# Patient Record
Sex: MALE | Race: ASIAN | NOT HISPANIC OR LATINO | Employment: FULL TIME | URBAN - METROPOLITAN AREA
[De-identification: names, ages, dates, MRNs, and addresses within clinical notes are randomized per-mention and may not be internally consistent; named-entity substitution may affect disease eponyms.]

---

## 2017-08-22 ENCOUNTER — OFFICE VISIT - HEALTHEAST (OUTPATIENT)
Dept: FAMILY MEDICINE | Facility: CLINIC | Age: 37
End: 2017-08-22

## 2017-08-22 DIAGNOSIS — E03.8 SUBCLINICAL HYPOTHYROIDISM: ICD-10-CM

## 2017-08-22 DIAGNOSIS — Z00.00 ROUTINE GENERAL MEDICAL EXAMINATION AT A HEALTH CARE FACILITY: ICD-10-CM

## 2017-08-22 LAB
CHOLEST SERPL-MCNC: 190 MG/DL
FASTING STATUS PATIENT QL REPORTED: YES
HDLC SERPL-MCNC: 41 MG/DL
LDLC SERPL CALC-MCNC: 115 MG/DL
TRIGL SERPL-MCNC: 169 MG/DL

## 2017-08-22 ASSESSMENT — MIFFLIN-ST. JEOR: SCORE: 1807.12

## 2017-08-23 ENCOUNTER — COMMUNICATION - HEALTHEAST (OUTPATIENT)
Dept: FAMILY MEDICINE | Facility: CLINIC | Age: 37
End: 2017-08-23

## 2017-08-23 DIAGNOSIS — E03.8 SUBCLINICAL HYPOTHYROIDISM: ICD-10-CM

## 2017-12-04 ENCOUNTER — OFFICE VISIT - HEALTHEAST (OUTPATIENT)
Dept: FAMILY MEDICINE | Facility: CLINIC | Age: 37
End: 2017-12-04

## 2017-12-04 DIAGNOSIS — E03.8 SUBCLINICAL HYPOTHYROIDISM: ICD-10-CM

## 2017-12-05 ENCOUNTER — HOSPITAL ENCOUNTER (OUTPATIENT)
Dept: ULTRASOUND IMAGING | Facility: CLINIC | Age: 37
Discharge: HOME | End: 2017-12-05
Attending: FAMILY MEDICINE

## 2017-12-05 ENCOUNTER — AMBULATORY - HEALTHEAST (OUTPATIENT)
Dept: FAMILY MEDICINE | Facility: CLINIC | Age: 37
End: 2017-12-05

## 2017-12-05 DIAGNOSIS — E55.9 VITAMIN D DEFICIENCY: ICD-10-CM

## 2017-12-05 DIAGNOSIS — E03.8 SUBCLINICAL HYPOTHYROIDISM: ICD-10-CM

## 2017-12-08 ENCOUNTER — AMBULATORY - HEALTHEAST (OUTPATIENT)
Dept: FAMILY MEDICINE | Facility: CLINIC | Age: 37
End: 2017-12-08

## 2017-12-08 DIAGNOSIS — E03.8 SUBCLINICAL HYPOTHYROIDISM: ICD-10-CM

## 2018-03-06 ENCOUNTER — COMMUNICATION - HEALTHEAST (OUTPATIENT)
Dept: FAMILY MEDICINE | Facility: CLINIC | Age: 38
End: 2018-03-06

## 2018-03-06 DIAGNOSIS — E55.9 VITAMIN D DEFICIENCY: ICD-10-CM

## 2018-06-11 ENCOUNTER — COMMUNICATION - HEALTHEAST (OUTPATIENT)
Dept: FAMILY MEDICINE | Facility: CLINIC | Age: 38
End: 2018-06-11

## 2018-06-11 DIAGNOSIS — E03.8 SUBCLINICAL HYPOTHYROIDISM: ICD-10-CM

## 2018-08-03 ENCOUNTER — COMMUNICATION - HEALTHEAST (OUTPATIENT)
Dept: FAMILY MEDICINE | Facility: CLINIC | Age: 38
End: 2018-08-03

## 2018-08-03 DIAGNOSIS — E03.8 SUBCLINICAL HYPOTHYROIDISM: ICD-10-CM

## 2018-08-24 ENCOUNTER — OFFICE VISIT - HEALTHEAST (OUTPATIENT)
Dept: FAMILY MEDICINE | Facility: CLINIC | Age: 38
End: 2018-08-24

## 2018-08-24 DIAGNOSIS — E55.9 VITAMIN D DEFICIENCY: ICD-10-CM

## 2018-08-24 DIAGNOSIS — L30.9 CHRONIC ECZEMA: ICD-10-CM

## 2018-08-24 DIAGNOSIS — E03.9 HYPOTHYROIDISM, UNSPECIFIED TYPE: ICD-10-CM

## 2018-08-24 DIAGNOSIS — Z00.00 ROUTINE GENERAL MEDICAL EXAMINATION AT A HEALTH CARE FACILITY: ICD-10-CM

## 2018-08-24 LAB
ALBUMIN SERPL-MCNC: 4.4 G/DL (ref 3.5–5)
ALP SERPL-CCNC: 52 U/L (ref 45–120)
ALT SERPL W P-5'-P-CCNC: 17 U/L (ref 0–45)
ANION GAP SERPL CALCULATED.3IONS-SCNC: 7 MMOL/L (ref 5–18)
AST SERPL W P-5'-P-CCNC: 19 U/L (ref 0–40)
BILIRUB SERPL-MCNC: 0.5 MG/DL (ref 0–1)
BUN SERPL-MCNC: 13 MG/DL (ref 8–22)
CALCIUM SERPL-MCNC: 9.9 MG/DL (ref 8.5–10.5)
CHLORIDE BLD-SCNC: 106 MMOL/L (ref 98–107)
CHOLEST SERPL-MCNC: 197 MG/DL
CO2 SERPL-SCNC: 27 MMOL/L (ref 22–31)
CREAT SERPL-MCNC: 1.03 MG/DL (ref 0.7–1.3)
FASTING STATUS PATIENT QL REPORTED: YES
GFR SERPL CREATININE-BSD FRML MDRD: >60 ML/MIN/1.73M2
GLUCOSE BLD-MCNC: 101 MG/DL (ref 70–125)
HDLC SERPL-MCNC: 48 MG/DL
LDLC SERPL CALC-MCNC: 132 MG/DL
POTASSIUM BLD-SCNC: 4.8 MMOL/L (ref 3.5–5)
PROT SERPL-MCNC: 7.7 G/DL (ref 6–8)
SODIUM SERPL-SCNC: 140 MMOL/L (ref 136–145)
T4 FREE SERPL-MCNC: 1.1 NG/DL (ref 0.7–1.8)
TRIGL SERPL-MCNC: 85 MG/DL
TSH SERPL DL<=0.005 MIU/L-ACNC: 3.22 UIU/ML (ref 0.3–5)

## 2018-08-24 ASSESSMENT — MIFFLIN-ST. JEOR: SCORE: 1786.26

## 2018-08-27 LAB
25(OH)D3 SERPL-MCNC: 17.9 NG/ML (ref 30–80)
25(OH)D3 SERPL-MCNC: 17.9 NG/ML (ref 30–80)

## 2018-11-13 ENCOUNTER — COMMUNICATION - HEALTHEAST (OUTPATIENT)
Dept: FAMILY MEDICINE | Facility: CLINIC | Age: 38
End: 2018-11-13

## 2018-11-13 DIAGNOSIS — E03.8 SUBCLINICAL HYPOTHYROIDISM: ICD-10-CM

## 2019-01-10 ENCOUNTER — OFFICE VISIT - HEALTHEAST (OUTPATIENT)
Dept: FAMILY MEDICINE | Facility: CLINIC | Age: 39
End: 2019-01-10

## 2019-01-10 DIAGNOSIS — J06.9 VIRAL URI WITH COUGH: ICD-10-CM

## 2019-06-03 ENCOUNTER — COMMUNICATION - HEALTHEAST (OUTPATIENT)
Dept: FAMILY MEDICINE | Facility: CLINIC | Age: 39
End: 2019-06-03

## 2019-06-03 DIAGNOSIS — E03.8 SUBCLINICAL HYPOTHYROIDISM: ICD-10-CM

## 2019-09-11 ENCOUNTER — COMMUNICATION - HEALTHEAST (OUTPATIENT)
Dept: FAMILY MEDICINE | Facility: CLINIC | Age: 39
End: 2019-09-11

## 2019-09-11 DIAGNOSIS — E03.8 SUBCLINICAL HYPOTHYROIDISM: ICD-10-CM

## 2019-09-13 ENCOUNTER — OFFICE VISIT - HEALTHEAST (OUTPATIENT)
Dept: FAMILY MEDICINE | Facility: CLINIC | Age: 39
End: 2019-09-13

## 2019-09-13 DIAGNOSIS — E03.9 HYPOTHYROIDISM, UNSPECIFIED TYPE: ICD-10-CM

## 2019-09-13 DIAGNOSIS — Z31.69 INFERTILITY COUNSELING: ICD-10-CM

## 2019-09-13 DIAGNOSIS — Z23 TETANUS-DIPHTHERIA (TD) VACCINATION: ICD-10-CM

## 2019-09-13 DIAGNOSIS — Z00.00 ROUTINE GENERAL MEDICAL EXAMINATION AT A HEALTH CARE FACILITY: ICD-10-CM

## 2019-09-13 DIAGNOSIS — E55.9 VITAMIN D DEFICIENCY: ICD-10-CM

## 2019-09-13 DIAGNOSIS — Z23 INFLUENZA VACCINE NEEDED: ICD-10-CM

## 2019-09-13 DIAGNOSIS — L30.9 CHRONIC ECZEMA: ICD-10-CM

## 2019-09-13 LAB
ALBUMIN SERPL-MCNC: 4.2 G/DL (ref 3.5–5)
ALP SERPL-CCNC: 50 U/L (ref 45–120)
ALT SERPL W P-5'-P-CCNC: 12 U/L (ref 0–45)
ANION GAP SERPL CALCULATED.3IONS-SCNC: 8 MMOL/L (ref 5–18)
AST SERPL W P-5'-P-CCNC: 18 U/L (ref 0–40)
BILIRUB SERPL-MCNC: 0.4 MG/DL (ref 0–1)
BUN SERPL-MCNC: 17 MG/DL (ref 8–22)
CALCIUM SERPL-MCNC: 9.4 MG/DL (ref 8.5–10.5)
CHLORIDE BLD-SCNC: 103 MMOL/L (ref 98–107)
CHOLEST SERPL-MCNC: 201 MG/DL
CO2 SERPL-SCNC: 26 MMOL/L (ref 22–31)
CREAT SERPL-MCNC: 1.04 MG/DL (ref 0.7–1.3)
FASTING STATUS PATIENT QL REPORTED: YES
GFR SERPL CREATININE-BSD FRML MDRD: >60 ML/MIN/1.73M2
GLUCOSE BLD-MCNC: 99 MG/DL (ref 70–125)
HDLC SERPL-MCNC: 48 MG/DL
HIV 1+2 AB+HIV1 P24 AG SERPL QL IA: NEGATIVE
LDLC SERPL CALC-MCNC: 130 MG/DL
POTASSIUM BLD-SCNC: 4.5 MMOL/L (ref 3.5–5)
PROT SERPL-MCNC: 7.6 G/DL (ref 6–8)
SODIUM SERPL-SCNC: 137 MMOL/L (ref 136–145)
TRIGL SERPL-MCNC: 116 MG/DL
TSH SERPL DL<=0.005 MIU/L-ACNC: 3.59 UIU/ML (ref 0.3–5)

## 2019-09-13 ASSESSMENT — MIFFLIN-ST. JEOR: SCORE: 1814.83

## 2019-09-16 LAB — 25(OH)D3 SERPL-MCNC: 18.8 NG/ML (ref 30–80)

## 2019-09-18 ENCOUNTER — AMBULATORY - HEALTHEAST (OUTPATIENT)
Dept: FAMILY MEDICINE | Facility: CLINIC | Age: 39
End: 2019-09-18

## 2019-09-18 DIAGNOSIS — E55.9 VITAMIN D DEFICIENCY: ICD-10-CM

## 2019-11-21 ENCOUNTER — AMBULATORY - HEALTHEAST (OUTPATIENT)
Dept: LAB | Facility: CLINIC | Age: 39
End: 2019-11-21

## 2019-11-21 DIAGNOSIS — Z31.69 INFERTILITY COUNSELING: ICD-10-CM

## 2019-11-22 LAB
ANNOTATION COMMENT IMP: ABNORMAL
CHARACTER SMN: NORMAL
IMM GERM CELLS # SMN: 0 X10(6)
PATIENT LOCATION: ABNORMAL
PH SMN: 8.5 [PH]
SEX ABSTIN DURATION TIME PATIENT: 2 D
SMN ANALYSIS METHOD: ABNORMAL
SPEC CONTAINER SPEC: ABNORMAL
SPECIMEN VOL SMN: 2.5 ML
SPERM # SMN: 18.7 X10(6)
SPERM ABNORM HEAD SHAPE NFR SMN: 15.5 %
SPERM ABNORM HEAD SIZE NFR SMN: 0 %
SPERM ABNORM MIDPIECE NFR SMN: 24.5 %
SPERM ABNORM TAIL NFR SMN: 34 %
SPERM ACROSOME DEFECTS NFR SMN: 21 %
SPERM AGGLUTINATED SMN QL: 4
SPERM DOUBLE FORMS NFR SMN: 1.5 %
SPERM MOTILE # SMN: 6.9 X10(6)
SPERM MOTILE NFR SMN: 17.3 X10(6)
SPERM MOTILE NFR SMN: 37 %
SPERM MOTILE SMN QL MICRO: 3.5
SPERM MULTIPLE DEFECTS NFR SMN: 0 %
SPERM NORM NFR SMN: 3.5 %
VISC SMN QL: 4
WBC # SMN: 0 X10(6)

## 2019-12-18 ENCOUNTER — COMMUNICATION - HEALTHEAST (OUTPATIENT)
Dept: FAMILY MEDICINE | Facility: CLINIC | Age: 39
End: 2019-12-18

## 2019-12-18 DIAGNOSIS — E55.9 VITAMIN D DEFICIENCY: ICD-10-CM

## 2020-06-25 ENCOUNTER — COMMUNICATION - HEALTHEAST (OUTPATIENT)
Dept: FAMILY MEDICINE | Facility: CLINIC | Age: 40
End: 2020-06-25

## 2020-06-25 DIAGNOSIS — E03.8 SUBCLINICAL HYPOTHYROIDISM: ICD-10-CM

## 2020-10-29 ENCOUNTER — COMMUNICATION - HEALTHEAST (OUTPATIENT)
Dept: FAMILY MEDICINE | Facility: CLINIC | Age: 40
End: 2020-10-29

## 2021-04-14 ENCOUNTER — COMMUNICATION - HEALTHEAST (OUTPATIENT)
Dept: FAMILY MEDICINE | Facility: CLINIC | Age: 41
End: 2021-04-14

## 2021-04-14 DIAGNOSIS — E03.8 SUBCLINICAL HYPOTHYROIDISM: ICD-10-CM

## 2021-05-19 ENCOUNTER — COMMUNICATION - HEALTHEAST (OUTPATIENT)
Dept: ADMINISTRATIVE | Facility: CLINIC | Age: 41
End: 2021-05-19

## 2021-05-19 ENCOUNTER — OFFICE VISIT - HEALTHEAST (OUTPATIENT)
Dept: FAMILY MEDICINE | Facility: CLINIC | Age: 41
End: 2021-05-19

## 2021-05-19 DIAGNOSIS — E55.9 VITAMIN D DEFICIENCY: ICD-10-CM

## 2021-05-19 DIAGNOSIS — Z11.59 NEED FOR HEPATITIS C SCREENING TEST: ICD-10-CM

## 2021-05-19 DIAGNOSIS — Z00.00 ROUTINE GENERAL MEDICAL EXAMINATION AT A HEALTH CARE FACILITY: ICD-10-CM

## 2021-05-19 DIAGNOSIS — E03.8 SUBCLINICAL HYPOTHYROIDISM: ICD-10-CM

## 2021-05-19 LAB
ALBUMIN SERPL-MCNC: 4 G/DL (ref 3.5–5)
ALP SERPL-CCNC: 50 U/L (ref 45–120)
ALT SERPL W P-5'-P-CCNC: 21 U/L (ref 0–45)
ANION GAP SERPL CALCULATED.3IONS-SCNC: 9 MMOL/L (ref 5–18)
AST SERPL W P-5'-P-CCNC: 19 U/L (ref 0–40)
BILIRUB SERPL-MCNC: 0.4 MG/DL (ref 0–1)
BUN SERPL-MCNC: 10 MG/DL (ref 8–22)
CALCIUM SERPL-MCNC: 9 MG/DL (ref 8.5–10.5)
CHLORIDE BLD-SCNC: 106 MMOL/L (ref 98–107)
CHOLEST SERPL-MCNC: 181 MG/DL
CO2 SERPL-SCNC: 27 MMOL/L (ref 22–31)
CREAT SERPL-MCNC: 1 MG/DL (ref 0.7–1.3)
FASTING STATUS PATIENT QL REPORTED: YES
GFR SERPL CREATININE-BSD FRML MDRD: >60 ML/MIN/1.73M2
GLUCOSE BLD-MCNC: 100 MG/DL (ref 70–125)
HDLC SERPL-MCNC: 42 MG/DL
LDLC SERPL CALC-MCNC: 101 MG/DL
POTASSIUM BLD-SCNC: 4.7 MMOL/L (ref 3.5–5)
PROT SERPL-MCNC: 7.2 G/DL (ref 6–8)
SODIUM SERPL-SCNC: 142 MMOL/L (ref 136–145)
T4 FREE SERPL-MCNC: 1 NG/DL (ref 0.7–1.8)
TRIGL SERPL-MCNC: 192 MG/DL
TSH SERPL DL<=0.005 MIU/L-ACNC: 4.82 UIU/ML (ref 0.3–5)

## 2021-05-19 RX ORDER — LEVOTHYROXINE SODIUM 75 UG/1
TABLET ORAL
Qty: 90 TABLET | Refills: 1 | Status: SHIPPED | OUTPATIENT
Start: 2021-05-19 | End: 2021-09-22

## 2021-05-20 LAB
25(OH)D3 SERPL-MCNC: 18.6 NG/ML (ref 30–80)
25(OH)D3 SERPL-MCNC: 18.6 NG/ML (ref 30–80)
HCV AB SERPL QL IA: NEGATIVE

## 2021-05-27 VITALS
HEART RATE: 66 BPM | SYSTOLIC BLOOD PRESSURE: 116 MMHG | WEIGHT: 193.5 LBS | OXYGEN SATURATION: 98 % | DIASTOLIC BLOOD PRESSURE: 82 MMHG

## 2021-05-31 VITALS — HEIGHT: 72 IN | WEIGHT: 188.3 LBS | BODY MASS INDEX: 25.5 KG/M2

## 2021-05-31 VITALS — WEIGHT: 190 LBS | BODY MASS INDEX: 25.77 KG/M2

## 2021-06-01 VITALS — WEIGHT: 183.7 LBS | HEIGHT: 72 IN | BODY MASS INDEX: 24.88 KG/M2

## 2021-06-01 NOTE — TELEPHONE ENCOUNTER
RN cannot approve Refill Request    RN can NOT refill this medication Protocol failed and NO refill given. Last office visit: 12/4/2017 Diego Dior MD Last Physical: 8/24/2018 Last MTM visit: Visit date not found Last visit same specialty: 1/10/2019 Sunita Morrow MD.  Next visit within 3 mo: Visit date not found  Next physical within 3 mo: Visit date not found      Paz Aquino, [facility] Triage/Med Refill 9/11/2019    Requested Prescriptions   Pending Prescriptions Disp Refills     levothyroxine (SYNTHROID, LEVOTHROID) 75 MCG tablet 90 tablet 0     Sig: Take 1 tablet (75 mcg total) by mouth Daily at 6:00 am.       Thyroid Hormones Protocol Failed - 9/11/2019  1:35 PM        Failed - TSH on file in past 12 months for patient age 12 & older     TSH   Date Value Ref Range Status   08/24/2018 3.22 0.30 - 5.00 uIU/mL Final                   Passed - Provider visit in past 12 months or next 3 months     Last office visit with prescriber/PCP: 12/4/2017 Diego Dior MD OR same dept: 1/10/2019 Sunita Morrow MD OR same specialty: 1/10/2019 Sunita Morrow MD  Last physical: 8/24/2018 Last MTM visit: Visit date not found   Next visit within 3 mo: Visit date not found  Next physical within 3 mo: Visit date not found  Prescriber OR PCP: Diego Dior MD  Last diagnosis associated with med order: 1. Subclinical hypothyroidism  - levothyroxine (SYNTHROID, LEVOTHROID) 75 MCG tablet; Take 1 tablet (75 mcg total) by mouth Daily at 6:00 am.  Dispense: 90 tablet; Refill: 0    If protocol passes may refill for 12 months if within 3 months of last provider visit (or a total of 15 months).

## 2021-06-02 VITALS — WEIGHT: 185.4 LBS | BODY MASS INDEX: 25.14 KG/M2

## 2021-06-03 VITALS
WEIGHT: 190 LBS | RESPIRATION RATE: 16 BRPM | DIASTOLIC BLOOD PRESSURE: 78 MMHG | HEART RATE: 66 BPM | HEIGHT: 72 IN | SYSTOLIC BLOOD PRESSURE: 114 MMHG | OXYGEN SATURATION: 99 % | BODY MASS INDEX: 25.73 KG/M2

## 2021-06-04 NOTE — TELEPHONE ENCOUNTER
Reached out to patient and relayed the below message. No additional questions at this time. Barbie Lorenz

## 2021-06-04 NOTE — TELEPHONE ENCOUNTER
RN cannot approve Refill Request    RN can NOT refill this medication med is not covered by policy/route to provider. Last office visit: 12/4/2017 Diego Dior MD Last Physical: 9/13/2019 Last MTM visit: Visit date not found Last visit same specialty: 1/10/2019 Sunita Morrow MD.  Next visit within 3 mo: Visit date not found  Next physical within 3 mo: Visit date not found      Isaura Torrez, [facility] Triage/Med Refill 12/19/2019    Requested Prescriptions   Pending Prescriptions Disp Refills     ergocalciferol (ERGOCALCIFEROL) 1,250 mcg (50,000 unit) capsule [Pharmacy Med Name: VITAMIN D2 50,000IU (ERGO) CAP RX] 12 capsule 0     Sig: TAKE 1 CAPSULE BY MOUTH 1 TIME A WEEK FOR 12 DOSES       There is no refill protocol information for this order

## 2021-06-12 NOTE — PROGRESS NOTES
Assessment:     1. Routine general medical examination at a health care facility  - Lipid Cascade  - Comprehensive Metabolic Panel    2. Subclinical hypothyroidism  - Thyroid Stimulating Hormone (TSH)  - T4, Free       Plan:      1.  Counseling done with regards to the necessity for physical exam.  Discussed cardiovascular disease as well as cancer screening.  I did encourage him to make sure to exercise adequately.  Also encouraged him to continue to watch his diet.  We will recheck his thyroid to the since he has not been on the medication for some months now.  Will restart medication depending on the results.  He will call to let me know if he has any other needs.  He is currently still working on infertility and his wife is being seen by an OB/GYN.  He is a semen analysis report was printed and given to him.  2. Patient Counseling:  --Nutrition: Stressed importance of moderation in sodium/caffeine intake, saturated fat and cholesterol, caloric balance, sufficient intake of fresh fruits, vegetables, fiber, calcium, iron, and 1 mg of folate supplement per day (for females capable of pregnancy).  --Exercise: Stressed the importance of regular exercise.  This was discussed, with regards to cardiovascular disease prevention as well as prevention of diabetes mellitus, hypertension and hyperlipidemia.  --Consistent screen was also discussed.  Discussion based specifically on patient's age.  --Questions regards to patient's health maintenance were answered, and full counseling done without regards.     --Immunizations reviewed.  --Discussed benefits of screening colonoscopy.  --After hours service discussed with patient    3. Discussed the patient's BMI with him.  The BMI is in the acceptable range  4. Follow up as needed for acute illness   5.The following high BMI interventions were performed this visit: encouragement to exercise, weight monitoring and prescribed dietary intake  Subjective:       Manoj Ling is a  37 y.o. male and is here for a comprehensive physical exam. The patient reports no problems.  He continues to exercise being aware of his father's history of heart disease.  We will recheck his cholesterol and lipids and compared with the previous ones that he has had.  He does exercise do not really very adequately he uses the elliptical about once a week and does walk also once a week.  He has a history of subclinical hypothyroidism, he was on replacement levothyroxine at 50 mcg daily but because of his Jain fasting he stopped taking it about 2 months or so ago on his own.  He will want to have a recheck of the thyroid function and restart the medication if needed.  He denied having any known symptoms at this point.  He noted no specific changes but he was aware of while he was taken the medication.    Do you take any herbs or supplements that were not prescribed by a doctor? no  Are you taking aspirin daily? no    History reviewed. No pertinent past medical history.  History reviewed. No pertinent surgical history.  Social History     Social History     Marital status:      Spouse name: N/A     Number of children: N/A     Years of education: N/A     Social History Main Topics     Smoking status: Never Smoker     Smokeless tobacco: Never Used     Alcohol use No     Drug use: No     Sexual activity: Yes     Partners: Female     Other Topics Concern     None     Social History Narrative     Family History   Problem Relation Age of Onset     Hypertension Mother      Thyroid disease Mother      Diabetes Father      Heart disease Father      Cancer Maternal Grandfather      No Known Allergies  Current Outpatient Prescriptions   Medication Sig Dispense Refill     levothyroxine (SYNTHROID, LEVOTHROID) 50 MCG tablet Take 1 tablet (50 mcg total) by mouth Daily at 6:00 am. 90 tablet 1     No current facility-administered medications for this visit.          Review of Systems  Do you have pain that bothers you in  your daily life? no    Review of Systems   Constitutional:Denied any fatigue no fevers no chills.  Has good appetite.  HEENT: Does not have any neck pain.  No difficulty swallowing, denies having any postnasal drips.No voice changes.  Respiratory: There is no cough.  No chest wall pain.  Cardiovascular: Denied chest pain shortness of breath or palpitations.  There is no notable lower extremity swelling.    Gastrointestinal: Denies nausea vomiting.  No abdominal pain no diarrhea or constipation.  Endocrine:Has no sensitivity to cold or heat.  Denied undue thirst.   Genitourinary:Has no urinary symptoms, no nocturia.  Musculoskeletal: There is no musculoskeletal pain and swelling.    Skin: He does not have any rashes.   Allergic/Immunologic: Negative.   Neurological: No Numbness  Hematological: Negative.   Psychiatric/Behavioral: No anxiety or depression symptoms.        Objective:     Vitals:    08/22/17 0806   BP: 104/70   Pulse: 60   Weight: 188 lb 4.8 oz (85.4 kg)   Height: 6' (1.829 m)     Physical Exam:  General Appearance: Alert, cooperative, no distress, appears stated age  Head: Normocephalic, without obvious abnormality, atraumatic  Eyes: PERRL, conjunctiva/corneas clear, EOM's intact  Ears: Normal TM's and external ear canals, both ears  Nose: Nares normal, septum midline,mucosa normal, no drainage  Throat: Lips, mucosa, and tongue normal; teeth and gums normal  Neck: Supple, symmetrical, trachea midline, no adenopathy;  thyroid: not enlarged, symmetric, no tenderness  Back: Symmetric, no curvature, ROM normal, no CVA tenderness  Lungs: Clear to auscultation bilaterally, respirations unlabored  Heart: Regular rate and rhythm, S1 and S2 normal, no murmur, rub, or gallop,  Abdomen: Soft, non-tender, bowel sounds active all four quadrants,  no masses, no organomegaly.  Musculoskeletal: Normal range of motion. No joint swelling or deformity.   Extremities: Extremities normal, atraumatic, no cyanosis or  edema  Skin: Skin color, texture, turgor normal, no rashes or lesions  Lymph nodes: Cervical, supraclavicular, and axillary nodes normal  Neurologic:  Alert and oriented times 3. He has normal reflexes.   Psychiatric: He has a normal mood and affect.

## 2021-06-14 NOTE — PROGRESS NOTES
ASSESSMENT:  1. Subclinical hypothyroidism  - Vitamin D, Total (25-Hydroxy)  - Thyroid Stimulating Hormone (TSH)  - T4, Free  - US Thyroid; Future      PLAN:  Explained to him the Physiology of thyroid gland.  At this point it appears as if he is not responding to medication use.  I would recheck his a thyroid gland at this point and depending on the level will increase his medications and get an ultrasound.  If there is no improvement then I will consider stopping the medication entirely possibly referring him to see the endocrinologist .    Orders Placed This Encounter   Procedures     US Thyroid     Standing Status:   Future     Standing Expiration Date:   12/4/2018     Order Specific Question:   Reason for Exam (Describe Symptoms):     Answer:   Subclinical Hypothyroidism.     Order Specific Question:   Can the procedure be changed per Radiologist protocol?     Answer:   Yes     Vitamin D, Total (25-Hydroxy)     Thyroid Stimulating Hormone (TSH)     T4, Free     There are no discontinued medications.    No Follow-up on file.      CHIEF COMPLAINT:  Chief Complaint   Patient presents with     Thyroid Problem     f/u on thyroid medications       HISTORY OF PRESENT ILLNESS:  Manoj is a 37 y.o. male presenting to the clinic today for follow-up of subclinical hypothyroidism.  He has been on levothyroxine at 50 mcg daily.  He denied having any notable improvement in his feelings.  He has not really been feeling any symptoms of hypothyroidism.  He noted that he is having increased difficulty with epigastric pain from possible reflux.  He denied any palpitations, he has no weight gain, does not have any abdominal discomfort, there is no notable constipation and he has been sleeping well and denies any nausea.  Did note that his appetite is good.  Of note he tends to looking to getting pregnant, his wife does have an appointment with the OB/GYN due to that.    REVIEW OF SYSTEMS:  Review of systems was done as in the  history otherwise negative.    PFSH:  Reviewed, as below.    History   Smoking Status     Never Smoker   Smokeless Tobacco     Never Used       Family History   Problem Relation Age of Onset     Hypertension Mother      Thyroid disease Mother      Diabetes Father      Heart disease Father      Cancer Maternal Grandfather        Social History     Social History     Marital status:      Spouse name: N/A     Number of children: N/A     Years of education: N/A     Occupational History     Not on file.     Social History Main Topics     Smoking status: Never Smoker     Smokeless tobacco: Never Used     Alcohol use No     Drug use: No     Sexual activity: Yes     Partners: Female     Other Topics Concern     Not on file     Social History Narrative       No past surgical history on file.    No Known Allergies    Active Ambulatory Problems     Diagnosis Date Noted     Infertility counseling 12/31/2014     Abnormal laboratory test result 12/31/2014     Reflux esophagitis 03/31/2015     Routine general medical examination at a health care facility 03/31/2015     Resolved Ambulatory Problems     Diagnosis Date Noted     No Resolved Ambulatory Problems     No Additional Past Medical History       Current Outpatient Prescriptions   Medication Sig Dispense Refill     levothyroxine (SYNTHROID, LEVOTHROID) 50 MCG tablet Take 1 tablet (50 mcg total) by mouth Daily at 6:00 am. 90 tablet 1     fluorometholone (FML) 0.1 % ophthalmic suspension INT 1 GTS AEY QID FOR 2 WEEKS  0     gatifloxacin (ZYMAXID) 0.5 % Drop ophthalmic solution INT 1 GTT AEY QID FOR 2 WEEKS. OK TO STOP IF THE BOTTLE RUNS OUT  0     No current facility-administered medications for this visit.        VITALS:  Vitals:    12/04/17 0819   BP: 116/73   Patient Site: Left Arm   Patient Position: Sitting   Cuff Size: Adult Regular   Pulse: 66   Weight: 190 lb (86.2 kg)     Wt Readings from Last 3 Encounters:   12/04/17 190 lb (86.2 kg)   08/22/17 188 lb 4.8 oz  (85.4 kg)   12/07/16 185 lb (83.9 kg)     Body mass index is 25.77 kg/(m^2).    PHYSICAL EXAM:  General Appearance: Alert, cooperative, no distress, appears stated age  HEENT: Pupils are equal and reactive, extraocular motions is normal.  Oropharynx is moist.  Neck is supple no notable thyromegaly.  External ears are normal.  Lungs: Clear to auscultation bilaterally, respirations unlabored  Heart: Regular rate and rhythm, S1 and S2 normal, no murmur, rub, or gallop  Abdomen: Soft  Musculoskeletal: Normal range of motion. No joint swelling or deformity.   Neurologic:  Alert and oriented times 3.   Psychiatric: Normal mood and affect.    MEDICATIONS:  Current Outpatient Prescriptions   Medication Sig Dispense Refill     levothyroxine (SYNTHROID, LEVOTHROID) 50 MCG tablet Take 1 tablet (50 mcg total) by mouth Daily at 6:00 am. 90 tablet 1     fluorometholone (FML) 0.1 % ophthalmic suspension INT 1 GTS AEY QID FOR 2 WEEKS  0     gatifloxacin (ZYMAXID) 0.5 % Drop ophthalmic solution INT 1 GTT AEY QID FOR 2 WEEKS. OK TO STOP IF THE BOTTLE RUNS OUT  0     No current facility-administered medications for this visit.

## 2021-06-16 NOTE — TELEPHONE ENCOUNTER
Medication refill request for levothyroxine. Patient has not been seen or had labs done since 9/2019. I have sent patient a [facility] [employer] message to come in for visit. Refill pended for 30 days.   Edwina Rivera LPN

## 2021-06-20 NOTE — PROGRESS NOTES
MALE PREVENTATIVE EXAM    Assessment and Plan:       1. Routine general medical examination at a health care facility  - Lipid Cascade  - Comprehensive Metabolic Panel    2. Hypothyroidism, unspecified type  - Thyroid Stimulating Hormone (TSH)  - T4, Free    3. Chronic eczema  - fluocinonide (LIDEX) 0.05 % external solution; Apply topically 2 (two) times a day.  Dispense: 60 mL; Refill: 1    4. Vitamin D deficiency  - Vitamin D, Total (25-Hydroxy)    Next follow up:  No Follow-up on file.    Immunization Review  Adult Imm Review: No immunizations due today  He does not smoke.    I discussed the following with the patient:   Adult Healthy Living: Importance of regular exercise  Healthy nutrition  Getting adequate sleep  Stress management  We discussed his concern regarding the itching in his face and putting some steroid solution for him to use.  Also discussed infertility, and his wife has undergone proper testing.  He will let me know if there is any other investigations that they will want him to do and will order for that.  But he is otherwise doing well.  We will get labs them follow-up with him.  We will also recheck his thyroid levels today.      Subjective:   Chief Complaint: Manoj Ling is an 38 y.o. male here for a preventative health visit.     HPI: Comes in for his routine physical exams.  His last physical exam was last year.  He noted having continued to be physically active do a little bit less than last year.  Denied any major issues with that.  He does have history of hypothyroidism and is on replacement levothyroxine.  We will recheck his thyroid today to see if there is any changes.  He denied having any symptoms associated with that.  He has also been trying to get pregnant with his wife and has been unsuccessful at this point.  They have had some workup including hysterosalpingogram, he has had a semen analysis which was normal.  His wife does have a follow-up appointment with her OB/GYN and  they will inform us if there is any need for any further workup.  Otherwise he also has a some itching that is noted on the face underneath his beard.  Noted that each time he will shower or wash his face he has some burning sensation that appears to be worse on the right side.  Noted this been going on for some time now.    Healthy Habits  Are you taking a daily aspirin? No  Do you typically exercising at least 40 min, 3-4 times per week?  NO not the last 2 months but plans to start up again  Do you usually eat at least 4 servings of fruit and vegetables a day, include whole grains and fiber and avoid regularly eating high fat foods? Yes  Have you had an eye exam in the past two years? Yes  Do you see a dentist twice per year? Yes  Do you have any concerns regarding sleep? No    Safety Screen  If you own firearms, are they secured in a locked gun cabinet or with trigger locks? The patient does not own any firearms  No Data Recorded    Review of Systems:    Constitutional:Denied any fatigue no fevers no chills.  Has good appetite.  HEENT: Does not have any neck pain.  No difficulty swallowing denies having any postnasal drips.    Respiratory: There is no cough.  No chest wall pain.  Cardiovascular: Denied chest pain shortness of breath or palpitations.  There is no notable lower extremity swelling.    Gastrointestinal: Denies nausea vomiting.  No abdominal pain no diarrhea or constipation.  Endocrine:Has no sensitivity to cold or heat.  Denied undue thirst.   Genitourinary:Has no urinary symptoms, no nocturia.  Denies having any pain to the testicles.  Musculoskeletal: There is no musculoskeletal pain and swelling.    Skin: He does not have any rashes but noted itching to the face as noted.  Denied having any other skin concerns.  Allergic/Immunologic: Negative.   Neurological: No Numbness  Hematological: Negative.   Psychiatric/Behavioral: No anxiety or depression symptoms.  Please see above.  The rest of the  review of systems are negative for all systems.     Cancer Screening     Patient has no health maintenance due at this time          Patient Care Team:  Diego Diro MD as PCP - General        History     Not marked as reviewed during this visit.            Objective:   Vital Signs:   Vitals:    08/24/18 0810   BP: 118/88   Patient Site: Right Arm   Patient Position: Sitting   Cuff Size: Adult Regular   Pulse: 66   Weight: 183 lb 11.2 oz (83.3 kg)   Height: 6' (1.829 m)            PHYSICAL EXAM  Physical Exam:  General Appearance: Alert, cooperative, no distress, appears stated age  Head: Normocephalic, without obvious abnormality, atraumatic  Eyes: PERRL, conjunctiva/corneas clear, EOM's intact  Ears: Normal TM's and external ear canals, both ears  Nose: Nares normal, septum midline,mucosa normal, no drainage  Throat: Lips, mucosa, and tongue normal; teeth and gums normal  Neck: Supple, symmetrical, trachea midline, no adenopathy;  thyroid: not enlarged, symmetric, no tenderness/mass/nodules; no carotid bruit or JVD  Back: Symmetric, no curvature, ROM normal, no CVA tenderness  Lungs: Clear to auscultation bilaterally, respirations unlabored  Heart: Regular rate and rhythm, S1 and S2 normal, no murmur, rub, or gallop,  Abdomen: Soft, non-tender, bowel sounds active all four quadrants,  no masses, no organomegaly  Musculoskeletal: Normal range of motion. No joint swelling or deformity.   Extremities: Extremities normal, atraumatic, no cyanosis or edema  Skin: Skin color, texture, turgor normal.Has some mild darkish discoloration to the face,and side of the face.Mostly on the right side.  Lymph nodes: Cervical, supraclavicular, and axillary nodes normal  Neurologic: He is alert. He has normal reflexes.   Psychiatric: He has a normal mood and affect.              Medication List          These changes are accurate as of 8/24/18  8:43 AM.  If you have any questions, ask your nurse or doctor.                START taking these medications          fluocinonide 0.05 % external solution   Also known as:  LIDEX   INSTRUCTIONS:  Apply topically 2 (two) times a day.   Started by:  Diego Dior MD             CONTINUE taking these medications          levothyroxine 75 MCG tablet   Also known as:  SYNTHROID, LEVOTHROID   INSTRUCTIONS:  Take 1 tablet (75 mcg total) by mouth Daily at 6:00 am.             STOP taking these medications          fluorometholone 0.1 % ophthalmic suspension   Also known as:  FML   Stopped by:  Diego Dior MD       gatifloxacin 0.5 % Drop ophthalmic solution   Also known as:  ZYMAXID   Stopped by:  Diego Dior MD            Where to Get Your Medications      These medications were sent to [facility] [address] [place] [address] [address] [facility] [place] [address] [address], [facility] [id number]    Hours:  24-hours Phone:  [phone]      fluocinonide 0.05 % external solution             Additional Screenings Completed Today:

## 2021-06-23 NOTE — PATIENT INSTRUCTIONS - HE
For your symptoms:     Vitamin C, zinc and echinacea help to improve immunity and fight infection.     Warm pack to face 4 times a day for 15 min at a time will help loosen phlegm     Saline nasal washing will help to drain mucus and clear sinus infection. - ocean spray and simply saline     NetiPot or NeilMed Sinus Rinse (use distilled water or water that has been boiled and then cooled)    Tylenol 325 mg extra strength:    - Take 1 to 2 tablets by mouth every 4 hours as needed (maximum 4000mg orally per day)     Ibuprofen 200 mg tablets:    - Take 2-4 tablets up to 3 times a day as needed. (always with food, call for stomach upset)    Pseudophedrine (Sudafed)- 12 hr version decongestant  containing medications (the kind you have to get behind the counter at the pharmacy)    Afrin nose spray.  Every 12 hours, Do not use for more than three days    Delsym cough syrup (orange bottle)    Cepacol lozenges or chloraseptic spray may also help numb a sore throat.     Gargling with salt water or drinking hot water may also help.     Drink 2 liters of water today slowly and continue to hydrate aggressively     HUMIDIFIER MAY ALSO BE HELPFUL.

## 2021-06-23 NOTE — PROGRESS NOTES
Assessment/plan   Manoj Ling is a 38 y.o. male who is a patient of Diego Dior MD.    Manoj was seen today for uri.    Diagnoses and all orders for this visit:    Viral URI with cough. Discussed the typical course of a viral upper respiratory infection.  No antibiotics indicated at this time.  Recommend symptomatic treatment such as decongestants and acetaminophen or ibuprofen as appropriate.  Recommend follow up if getting worse or not improving.    -     benzonatate (TESSALON PERLES) 100 MG capsule; Take 1 capsule (100 mg total) by mouth every 6 (six) hours as needed for cough.      Follow up: for annual physical    Sunita Morrow MD    Subjective:      HPI: Manoj Ling is a 38 y.o. male who is here for:    Chief Complaint   Patient presents with     URI     cold/cough x 5 days       URI sx: Sx started with mild sore throat on morning of Friday 1/4. On sat he actually stayed in bed all day due to chills and start of a cough. Monday had cough, Tues thought it was better, Wed was worse. Low grade fever- 99.5 yesterday, 98.6 this AM  + cough, runny nose, congestion, intermittent aches.   Taking Tylenol cold and flu.    Traveled to [facility] and [facility]  12/23/18- 1/3/19 with his wife, cousin and aunt.  His wife was feeling a little sick with similar URI sx during the middle of the trip but she was getting better. At the end of the trip his cousin got sick as well and sx are similar.     Review of Systems:  No rash, swollen lymph nodes, night sweats or weight change.  12 point comprehensive review of systems was negative except as noted and HPI     Social History:  Social History     Social History Narrative     Not on file       Medical History:  Patient Active Problem List   Diagnosis     Infertility counseling     Abnormal laboratory test result     Reflux esophagitis     Routine general medical examination at a health care facility     No past medical history on file.  No past  surgical history on file.  Patient has no known allergies.  Family History   Problem Relation Age of Onset     Hypertension Mother      Thyroid disease Mother      Diabetes Father      Heart disease Father      Cancer Maternal Grandfather        Medications:  Current Outpatient Medications   Medication Sig Dispense Refill     fluocinonide (LIDEX) 0.05 % external solution Apply topically 2 (two) times a day. 60 mL 1     levothyroxine (SYNTHROID, LEVOTHROID) 75 MCG tablet Take 1 tablet (75 mcg total) by mouth Daily at 6:00 am. 90 tablet 1     benzonatate (TESSALON PERLES) 100 MG capsule Take 1 capsule (100 mg total) by mouth every 6 (six) hours as needed for cough. 30 capsule 0     No current facility-administered medications for this visit.        Imaging & Labs reviewed this visit: none      Objective:      Vitals:    01/10/19 1022   BP: 114/74   Pulse: 73   Temp: 98.7  F (37.1  C)   TempSrc: Oral   SpO2: 99%   Weight: 185 lb 6.4 oz (84.1 kg)       Physical Exam:     General: Alert, no acute distress.   HEENT: normocephalic, conjunctivae are clear, Normal pearly TMs bilaterally without erythema, pus or fluid. Position and landmarks are normal.  Nose with clear rhinorrhea  Oropharynx is mildly erythematous without petechea, without tonsillar hypertrophy, asymmetry, exudate or lesions.   Neck: supple without adenopathy  Lungs: Good aeration bilaterally. Clear to auscultation without wheezes, rales or rhonci.   Heart: regular rate and rhythm, normal S1 and S2, no murmurs  Abdomen: soft and nontender  Skin: clear without rash or lesions    Sunita Morrow MD

## 2021-06-27 ENCOUNTER — HEALTH MAINTENANCE LETTER (OUTPATIENT)
Age: 41
End: 2021-06-27

## 2021-06-28 NOTE — PROGRESS NOTES
Progress Notes by Diego Dior MD at 9/13/2019  8:00 AM     Author: Diego Dior MD Service: -- Author Type: Physician    Filed: 9/22/2019  4:10 PM Encounter Date: 9/13/2019 Status: Signed    : Diego Dior MD (Physician)       MALE PREVENTATIVE EXAM    Assessment and Plan:       1. Routine general medical examination at a health care facility  - Lipid Cascade  - Comprehensive Metabolic Panel  - HIV Antigen/Antibody Screening [place]  Routine labs ordered.Encouraged to continue to be aware of his activity.  2. Infertility counseling  - Semen Analysis  Still attempting to get pregnant.Will recheck the Semen analysis per the Obgyn.  3. Hypothyroidism, unspecified type  - Thyroid Cascade  No side effect to the medications.Will check the level.  4. Vitamin D deficiency  - Vitamin D, Total (25-Hydroxy)    5. Influenza vaccine needed    6. Tetanus-diphtheria (Td) vaccination  - Td, Preservative Free (green label)    7. Chronic eczema  - fluocinonide (LIDEX) 0.05 % external solution; Apply topically 2 (two) times a day.  Dispense: 60 mL; Refill: 2  Intermittent dermatitis on the beard. Solution has been helpful. Will refill.      Next follow up:  No follow-ups on file.    Immunization Review  Adult Imm Review: Due today, orders placed  Does not smoke.    I discussed the following with the patient:   Adult Healthy Living: Importance of regular exercise  Healthy nutrition  Getting adequate sleep  Stress management        Subjective:   Chief Complaint: Manoj Ling is an 39 y.o. male here for a preventative health visit.     HPI: Comes in today for his routine physical exam.  He is doing well.  Continues to be physically active, continues to watch his diet.  He does need to have his labs done.  He does have a history of hypothyroidism and has been on medication for that.  He noted no changes.  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  He has had a semen analysis and has been doing artificial insemination.  And has had some failed and was advised to consider doing another sperm analysis.  He is hoping to get an order for that at today's visit.  But he otherwise is feeling well.  He does need some medications refilled.  Healthy Habits  Are you taking a daily aspirin? No  Do you typically exercising at least 40 min, 3-4 times per week?  NO  Do you usually eat at least 4 servings of fruit and vegetables a day, include whole grains and fiber and avoid regularly eating high fat foods? Yes- tries to   Have you had an eye exam in the past two years? Yes  Do you see a dentist twice per year? NO  Do you have any concerns regarding sleep? No    Safety Screen  If you own firearms, are they secured in a locked gun cabinet or with trigger locks? The patient does not own any firearms  Do you feel you are safe where you are living?: Yes (9/13/2019  8:15 AM)  Do you feel you are safe in your relationship(s)?: Yes (9/13/2019  8:15 AM)      Review of Systems:    Constitutional:Denied any fatigue no fevers no chills.  Has good appetite.  HEENT: Does not have any neck pain.  No difficulty swallowing denies having any postnasal drips.    Respiratory: There is no cough.  No chest wall pain.  Cardiovascular: Denied chest pain shortness of breath or palpitations.  There is no notable lower extremity swelling.    Gastrointestinal: Denies nausea vomiting.  No abdominal pain no diarrhea or constipation.  Endocrine:Has no sensitivity to cold or heat.  Denied undue thirst.   Genitourinary:Has no urinary symptoms, no nocturia.  Musculoskeletal: There is no musculoskeletal pain and swelling.    Skin: He does not have any rashes.   Allergic/Immunologic: Negative.   Neurological: No Numbness  Hematological: Negative.   Psychiatric/Behavioral: No anxiety or depression symptoms.  Please see above.  The rest of the review of  systems are negative for all systems.     Cancer Screening     Patient has no health maintenance due at this time          Patient Care Team:  Diego Dior MD as PCP - General  Sunita Morrow MD as Assigned PCP        History     Reviewed By Date/Time Sections Reviewed    Becca Villafana CMA 9/13/2019  8:15 AM Tobacco            Objective:   Vital Signs:    Vitals:    09/13/19 0816   BP: 114/78   Pulse: 66   Resp: 16   SpO2: 99%   Weight: 190 lb (86.2 kg)   Height: 6' (1.829 m)     Physical Exam:  General Appearance: Alert, cooperative, no distress, appears stated age  Head: Normocephalic, without obvious abnormality, atraumatic  Eyes: PERRL, conjunctiva/corneas clear, EOM's intact  Ears: Normal TM's and external ear canals, both ears  Nose: Nares normal, septum midline,mucosa normal, no drainage  Throat: Lips, mucosa, and tongue normal; teeth and gums normal  Neck: Supple, symmetrical, trachea midline, no adenopathy;  thyroid: not enlarged, symmetric, no tenderness.  Back: Symmetric, no curvature, ROM normal, no CVA tenderness  Lungs: Clear to auscultation bilaterally, respirations unlabored  Heart: Regular rate and rhythm, S1 and S2 normal, no murmur, rub, or gallop,  Abdomen: Soft, non-tender, bowel sounds active all four quadrants,  no masses, no organomegaly  Musculoskeletal: Normal range of motion. No joint swelling or deformity.   Extremities: Extremities normal, atraumatic, no cyanosis or edema  Skin: Skin color, texture, turgor normal, no rashes or lesions  Lymph nodes: Cervical, supraclavicular, and axillary nodes normal  Neurologic: He is alert. He has normal reflexes.   Psychiatric: He has a normal mood and affect.              Medication List           Accurate as of 9/13/19  8:56 AM. If you have any questions, ask your nurse or doctor.               CONTINUE taking these medications    fluocinonide 0.05 % external solution  Also known as:  LIDEX  INSTRUCTIONS:  Apply topically  2 (two) times a day.        levothyroxine 75 MCG tablet  Also known as:  SYNTHROID, LEVOTHROID  INSTRUCTIONS:  Take 1 tablet (75 mcg total) by mouth Daily at 6:00 am.           STOP taking these medications    benzonatate 100 MG capsule  Also known as:  TESSALON PERLES  Stopped by:  Diego Dior MD           Where to Get Your Medications      These medications were sent to [id number] - [place] - [address] AT [facility] [address] [place] [address]    Hours:  24-hours Phone:  [phone]     fluocinonide 0.05 % external solution         Additional Screenings Completed Today:

## 2021-06-30 NOTE — PROGRESS NOTES
Progress Notes by Diego Dior MD at 5/19/2021  8:00 AM     Author: Diego Dior MD Service: -- Author Type: Physician    Filed: 5/19/2021  5:23 PM Encounter Date: 5/19/2021 Status: Signed    : Diego Dior MD (Physician)       MALE PREVENTATIVE EXAM    Assessment and Plan:     Patient has been advised of split billing requirements and indicates understanding: Yes    1. Routine general medical examination at a health care facility  - Lipid Cascade  - Comprehensive Metabolic Panel    2. Subclinical hypothyroidism  - Thyroid Stimulating Hormone (TSH)  - T4, Free    3. Vitamin D deficiency  - Vitamin D, Total (25-Hydroxy)    4. Need for hepatitis C screening test  - Hepatitis C Antibody (Anti-HCV)    I discussed his needs.  He is doing well.  Noted no major concerns at this time.  He is fasted so we will go ahead and get some labs.  Discussed health care screening but at this time will he is going to do the labs as noted above.  Encouraged him to continue to be as physically active as he can.  I did congratulate him on his recent discovery that his wife is pregnant and is about 15 months now.    Next follow up:  No follow-ups on file.    Immunization Review  Adult Imm Review: No immunizations due today  He does not smoke.    I discussed the following with the patient:   Adult Healthy Living: Importance of regular exercise  Healthy nutrition  Getting adequate sleep  Stress management    I have had an Advance Directives discussion with the patient.    Subjective:   Chief Complaint: Manoj Ling is an 41 y.o. male here for a preventative health visit.    Patient has been advised of split billing requirements and indicates understanding: Yes  HPI: Comes in today for physical exam.  Takes medications levothyroxine 75mcg for subclinical hypothyroidism.  Noted that he is doing well.  Does not have any major concerns at this time.  They have been doing gynecological  infertility clinics.  The wife is currently pregnant at 15 weeks.  He does feel well otherwise.    Healthy Habits  Are you taking a daily aspirin? No  Do you typically exercising at least 40 min, 3-4 times per week?  Yes  Do you usually eat at least 4 servings of fruit and vegetables a day, include whole grains and fiber and avoid regularly eating high fat foods? Yes  Have you had an eye exam in the past two years? Yes  Do you see a dentist twice per year? Yes  Do you have any concerns regarding sleep? No    Safety Screen  If you own firearms, are they secured in a locked gun cabinet or with trigger locks? The patient declines to answer  No data recorded    Review of Systems:  Please see above.  The rest of the review of systems are negative for all systems.     Cancer Screening     Patient has no health maintenance due at this time          Patient Care Team:  Diego Dior MD as PCP - General  Diego Dior MD as Assigned PCP        History     Reviewed By Date/Time Sections Reviewed    Ruben Galo, [facility] 5/19/2021  8:15 AM Tobacco            Objective:   Vital Signs:   Visit Vitals  /82 (Patient Site: Left Arm, Patient Position: Sitting, Cuff Size: Adult Regular)   Pulse 66   Wt 193 lb 8 oz (87.8 kg)   SpO2 98%   BMI 26.24 kg/m         Physical Exam:  General Appearance: Alert, cooperative, no distress, appears stated age  Head: Normocephalic, without obvious abnormality, atraumatic  Eyes: PERRL, conjunctiva/corneas clear, EOM's intact  Ears: Normal TM's and external ear canals, both ears  Nose: Nares normal, septum midline,mucosa normal, no drainage  Throat: Lips, mucosa, and tongue normal; teeth and gums normal  Neck: Supple, symmetrical, trachea midline, no adenopathy;  thyroid: not enlarged, symmetric, no tenderness/mass/nodules; no carotid bruit or JVD  Back: Symmetric, no curvature, ROM normal, no CVA tenderness  Lungs: Clear to auscultation bilaterally, respirations  unlabored  Heart: Regular rate and rhythm, S1 and S2 normal, no murmur, rub, or gallop,  Abdomen: Soft, non-tender, bowel sounds active all four quadrants,  no masses, no organomegaly  Musculoskeletal: Normal range of motion. No joint swelling or deformity.   Extremities: Extremities normal, atraumatic, no cyanosis or edema  Skin: Skin color, texture, turgor normal, no rashes or lesions  Lymph nodes: Cervical, supraclavicular, and axillary nodes normal  Neurologic: He is alert. He has normal reflexes.   Psychiatric: He has a normal mood and affect.              Medication List          Accurate as of May 19, 2021  3:36 PM. If you have any questions, ask your nurse or doctor.            CONTINUE taking these medications    cholecalciferol (vitamin D3) 5,000 unit Tab  INSTRUCTIONS: Take 2 capsules by mouth daily.        levothyroxine 75 MCG tablet  Also known as: SYNTHROID, LEVOTHROID  INSTRUCTIONS: TAKE ONE TABLET BY MOUTH DAILY AT 6:00 AM- PATIENT NEEDS TO BE SEEN FOR FURTHER REFILLS           STOP taking these medications    fluocinonide 0.05 % external solution  Also known as: LIDEX  Stopped by: Diego Dior MD            Additional Screenings Completed Today:

## 2021-07-03 NOTE — ADDENDUM NOTE
Addendum Note by Diego Dior MD at 12/4/2017  7:13 PM     Author: Diego Dior MD Service: -- Author Type: Physician    Filed: 12/4/2017  7:13 PM Encounter Date: 12/4/2017 Status: Signed    : Diego Dior MD (Physician)    Addended by: DIEGO DIOR on: 12/4/2017 07:13 PM        Modules accepted: Orders

## 2021-09-21 DIAGNOSIS — E03.8 SUBCLINICAL HYPOTHYROIDISM: ICD-10-CM

## 2021-09-21 NOTE — TELEPHONE ENCOUNTER
Reason for Call:  Medication or medication refill: levothyroxine (SYNTHROID, LEVOTHROID) 75 MCG tablet    Do you use a [facility]? NO  Name of the pharmacy and phone number for the current request: [facility] [address] in [place], [phone]    Name of the medication requested:   levothyroxine (SYNTHROID, LEVOTHROID) 75 MCG tablet 90 tablet 1 5/19/2021  No   Sig: [LEVOTHYROXINE (SYNTHROID, LEVOTHROID) 75 MCG TABLET] TAKE ONE TABLET BY MOUTH DAILY AT 6:00 AM     Can we leave a detailed message on this number? YES    Phone number patient can be reached at: Home number on file [phone] (home)    Best Time: any    Call taken on 9/21/2021 at 4:23 PM by Chata Robins

## 2021-09-24 RX ORDER — LEVOTHYROXINE SODIUM 75 UG/1
75 TABLET ORAL DAILY
Qty: 90 TABLET | Refills: 1 | Status: SHIPPED | OUTPATIENT
Start: 2021-09-24 | End: 2022-04-25

## 2021-10-16 ENCOUNTER — HEALTH MAINTENANCE LETTER (OUTPATIENT)
Age: 41
End: 2021-10-16

## 2021-10-18 ENCOUNTER — OFFICE VISIT (OUTPATIENT)
Dept: FAMILY MEDICINE | Facility: CLINIC | Age: 41
End: 2021-10-18
Payer: COMMERCIAL

## 2021-10-18 VITALS
HEART RATE: 84 BPM | SYSTOLIC BLOOD PRESSURE: 96 MMHG | WEIGHT: 193.3 LBS | DIASTOLIC BLOOD PRESSURE: 60 MMHG | BODY MASS INDEX: 26.22 KG/M2

## 2021-10-18 DIAGNOSIS — L30.9 ECZEMA OF FACE: Primary | ICD-10-CM

## 2021-10-18 PROCEDURE — 99213 OFFICE O/P EST LOW 20 MIN: CPT | Performed by: FAMILY MEDICINE

## 2021-10-18 RX ORDER — BETAMETHASONE VALERATE 1.2 MG/G
CREAM TOPICAL 2 TIMES DAILY
Qty: 45 G | Refills: 1 | Status: SHIPPED | OUTPATIENT
Start: 2021-10-18 | End: 2022-10-03

## 2021-10-18 NOTE — PROGRESS NOTES
Assessment & Plan     Eczema of face  - betamethasone valerate (VALISONE) 0.1 % external cream  Dispense: 45 g; Refill: 1  The lesions on the face does look like eczema/dermatitis mainly in the face.  I have given him the prescription as noted above to use for it.  And hopefully they will be helpful.  He can come in and let me know if there is no improvement he is having more changes.  }       No follow-ups on file.    Diego Dior MD  [facility]    Anna Aranda is a 41 year old who presents for the following health issues     HPI   Is here with concerns of him having some skin issues.  He has had scalp with seborrhea in the past.  He has also had some eczema around the face in the past.  Noted new lesions for about 2 to 3 months now.  Noted some itchiness.  He did not radiate anywhere wanted to come in to get a prescription.  Noted that he is doing well otherwise.  Expecting their first child in November.     Family History   Problem Relation Age of Onset     Hypertension Mother      Thyroid Disease Mother      Diabetes Father      Heart Disease Father      Cancer Maternal Grandfather       Social History     Socioeconomic History     Marital status:      Spouse name: Not on file     Number of children: Not on file     Years of education: Not on file     Highest education level: Not on file   Occupational History     Not on file   Tobacco Use     Smoking status: Never Smoker     Smokeless tobacco: Never Used   Substance and Sexual Activity     Alcohol use: No     Drug use: No     Sexual activity: Yes     Partners: Female   Other Topics Concern     Not on file   Social History Narrative     Not on file     Social Determinants of Health     Financial Resource Strain:      Difficulty of Paying Living Expenses:    Food Insecurity:      Worried About Running Out of Food in the Last Year:      Ran Out of Food in the Last Year:    Transportation Needs:      Lack of  Transportation (Medical):      Lack of Transportation (Non-Medical):    Physical Activity:      Days of Exercise per Week:      Minutes of Exercise per Session:    Stress:      Feeling of Stress :    Social Connections:      Frequency of Communication with Friends and Family:      Frequency of Social Gatherings with Friends and Family:      Attends Faith Services:      Active Member of Clubs or Organizations:      Attends Club or Organization Meetings:      Marital Status:    Intimate Partner Violence:      Fear of Current or Ex-Partner:      Emotionally Abused:      Physically Abused:      Sexually Abused:       Past Surgical History:   Procedure Laterality Date     LASIK  2017      No past medical history on file.         Review of Systems   Constitutional, HEENT, cardiovascular, pulmonary, gi and gu systems are negative, except as otherwise noted.      Objective    BP 96/60 (BP Location: Left arm, Patient Position: Sitting, Cuff Size: Adult Large)   Pulse 84   Wt 87.7 kg (193 lb 4.8 oz)   BMI 26.22 kg/m    Body mass index is 26.22 kg/m .  Physical Exam  Constitutional:       Appearance: Normal appearance. He is not ill-appearing.   Pulmonary:      Effort: Pulmonary effort is normal.   Skin:     General: Skin is warm.      Findings: Lesion present.      Comments: Hyperpigmented lesions noted on the left forehead, on the lower part of the neck as well as on the right cheek.  Lesion on the right cheek slightly raised but they all have faint scales.   Neurological:      Mental Status: He is alert.   Psychiatric:         Mood and Affect: Mood normal.

## 2022-07-23 ENCOUNTER — HEALTH MAINTENANCE LETTER (OUTPATIENT)
Age: 42
End: 2022-07-23

## 2022-07-28 ENCOUNTER — TELEPHONE (OUTPATIENT)
Dept: FAMILY MEDICINE | Facility: CLINIC | Age: 42
End: 2022-07-28

## 2022-07-28 NOTE — TELEPHONE ENCOUNTER
Reason for Call:  Other appointment    Detailed comments: Patient needs to be contacted to est. Care with a new provider for a med refill and physical. NO options were available for est. care for medication refill.     Phone Number Patient can be reached at: Home number on file [phone] (home)    Best Time: Any time    Can we leave a detailed message on this number? YES    Call taken on 7/28/2022 at 7:41 AM by Althea Gant

## 2022-08-08 ENCOUNTER — TELEPHONE (OUTPATIENT)
Dept: FAMILY MEDICINE | Facility: CLINIC | Age: 42
End: 2022-08-08

## 2022-08-08 DIAGNOSIS — E03.8 SUBCLINICAL HYPOTHYROIDISM: ICD-10-CM

## 2022-08-08 NOTE — TELEPHONE ENCOUNTER
Reason for Call:  Medication or medication refill:    Do you use a [facility]?  Name of the pharmacy and phone number for the current request:  [facility] - [place], [place] - [address] AT [address] TO [facility] [facility] [address]    Name of the medication requested: levothyroxine (SYNTHROID/LEVOTHROID) 75 MCG tablet    Other request: Pt called and is requesting fefill    Can we leave a detailed message on this number? YES    Phone number patient can be reached at: Home number on file [phone] (home)    Best Time: anytime    Call taken on 8/8/2022 at 3:55 PM by Denise Morrison

## 2022-08-09 RX ORDER — LEVOTHYROXINE SODIUM 75 UG/1
75 TABLET ORAL DAILY
Qty: 90 TABLET | Refills: 0 | Status: SHIPPED | OUTPATIENT
Start: 2022-08-09 | End: 2022-11-15

## 2022-08-09 NOTE — TELEPHONE ENCOUNTER
Left message for patient to call back to schedule an appointment with a new provider to establish care. Please help patient schedule an appt.

## 2022-08-09 NOTE — TELEPHONE ENCOUNTER
Left message for patient to call back., please relay below message and help schedule an appt, thanks.

## 2022-08-12 ENCOUNTER — TELEPHONE (OUTPATIENT)
Dept: FAMILY MEDICINE | Facility: CLINIC | Age: 42
End: 2022-08-12

## 2022-08-12 DIAGNOSIS — E03.8 SUBCLINICAL HYPOTHYROIDISM: ICD-10-CM

## 2022-08-12 RX ORDER — LEVOTHYROXINE SODIUM 75 UG/1
75 TABLET ORAL DAILY
Qty: 90 TABLET | Refills: 0 | Status: CANCELLED | OUTPATIENT
Start: 2022-08-12

## 2022-08-12 NOTE — TELEPHONE ENCOUNTER
Reason for Call:  Medication or medication refill:    Do you use a [facility]?  [facility]     Name of the medication requested: Levothyroxine 75mg     Other request: Patient has appt on 10/03/22 but will need refill to get him through until appt.  Patient is out of medication     Can we leave a detailed message on this number? YES    Phone number patient can be reached at: Home number on file [phone] (home)    Best Time: any     Call taken on 8/12/2022 at 10:46 AM by Ami Paz

## 2022-10-01 ENCOUNTER — HEALTH MAINTENANCE LETTER (OUTPATIENT)
Age: 42
End: 2022-10-01

## 2022-10-03 ENCOUNTER — OFFICE VISIT (OUTPATIENT)
Dept: FAMILY MEDICINE | Facility: CLINIC | Age: 42
End: 2022-10-03
Payer: COMMERCIAL

## 2022-10-03 VITALS
HEIGHT: 72 IN | SYSTOLIC BLOOD PRESSURE: 120 MMHG | OXYGEN SATURATION: 98 % | HEART RATE: 62 BPM | DIASTOLIC BLOOD PRESSURE: 80 MMHG | BODY MASS INDEX: 25.78 KG/M2 | WEIGHT: 190.3 LBS | TEMPERATURE: 97.7 F | RESPIRATION RATE: 16 BRPM

## 2022-10-03 DIAGNOSIS — Z00.00 ANNUAL PHYSICAL EXAM: ICD-10-CM

## 2022-10-03 DIAGNOSIS — D22.9 MULTIPLE ATYPICAL NEVI: ICD-10-CM

## 2022-10-03 DIAGNOSIS — E03.1 CONGENITAL HYPOTHYROIDISM WITHOUT GOITER: ICD-10-CM

## 2022-10-03 DIAGNOSIS — L20.9 ATOPIC DERMATITIS, UNSPECIFIED TYPE: ICD-10-CM

## 2022-10-03 DIAGNOSIS — Z23 HIGH PRIORITY FOR 2019-NCOV VACCINE: Primary | ICD-10-CM

## 2022-10-03 LAB
ALBUMIN SERPL BCG-MCNC: 4.4 G/DL (ref 3.5–5.2)
ALP SERPL-CCNC: 57 U/L (ref 40–129)
ALT SERPL W P-5'-P-CCNC: 14 U/L (ref 10–50)
ANION GAP SERPL CALCULATED.3IONS-SCNC: 9 MMOL/L (ref 7–15)
AST SERPL W P-5'-P-CCNC: 25 U/L (ref 10–50)
BILIRUB SERPL-MCNC: 0.4 MG/DL
BUN SERPL-MCNC: 13.9 MG/DL (ref 6–20)
CALCIUM SERPL-MCNC: 9.4 MG/DL (ref 8.6–10)
CHLORIDE SERPL-SCNC: 103 MMOL/L (ref 98–107)
CHOLEST SERPL-MCNC: 175 MG/DL
CREAT SERPL-MCNC: 0.97 MG/DL (ref 0.67–1.17)
DEPRECATED HCO3 PLAS-SCNC: 28 MMOL/L (ref 22–29)
ERYTHROCYTE [DISTWIDTH] IN BLOOD BY AUTOMATED COUNT: 12.4 % (ref 10–15)
GFR SERPL CREATININE-BSD FRML MDRD: >90 ML/MIN/1.73M2
GLUCOSE SERPL-MCNC: 90 MG/DL (ref 70–99)
HCT VFR BLD AUTO: 41.6 % (ref 40–53)
HDLC SERPL-MCNC: 41 MG/DL
HGB BLD-MCNC: 14.7 G/DL (ref 13.3–17.7)
LDLC SERPL CALC-MCNC: 105 MG/DL
MCH RBC QN AUTO: 28.9 PG (ref 26.5–33)
MCHC RBC AUTO-ENTMCNC: 35.3 G/DL (ref 31.5–36.5)
MCV RBC AUTO: 82 FL (ref 78–100)
NONHDLC SERPL-MCNC: 134 MG/DL
PLATELET # BLD AUTO: 276 10E3/UL (ref 150–450)
POTASSIUM SERPL-SCNC: 4.3 MMOL/L (ref 3.4–5.3)
PROT SERPL-MCNC: 7.8 G/DL (ref 6.4–8.3)
RBC # BLD AUTO: 5.09 10E6/UL (ref 4.4–5.9)
SODIUM SERPL-SCNC: 140 MMOL/L (ref 136–145)
TRIGL SERPL-MCNC: 143 MG/DL
TSH SERPL DL<=0.005 MIU/L-ACNC: 2.35 UIU/ML (ref 0.3–4.2)
WBC # BLD AUTO: 5.9 10E3/UL (ref 4–11)

## 2022-10-03 PROCEDURE — 99213 OFFICE O/P EST LOW 20 MIN: CPT | Mod: 25 | Performed by: STUDENT IN AN ORGANIZED HEALTH CARE EDUCATION/TRAINING PROGRAM

## 2022-10-03 PROCEDURE — 0124A COVID-19,PF,PFIZER BOOSTER BIVALENT: CPT | Performed by: STUDENT IN AN ORGANIZED HEALTH CARE EDUCATION/TRAINING PROGRAM

## 2022-10-03 PROCEDURE — 84443 ASSAY THYROID STIM HORMONE: CPT | Performed by: STUDENT IN AN ORGANIZED HEALTH CARE EDUCATION/TRAINING PROGRAM

## 2022-10-03 PROCEDURE — 36415 COLL VENOUS BLD VENIPUNCTURE: CPT | Performed by: STUDENT IN AN ORGANIZED HEALTH CARE EDUCATION/TRAINING PROGRAM

## 2022-10-03 PROCEDURE — 80053 COMPREHEN METABOLIC PANEL: CPT | Performed by: STUDENT IN AN ORGANIZED HEALTH CARE EDUCATION/TRAINING PROGRAM

## 2022-10-03 PROCEDURE — 90686 IIV4 VACC NO PRSV 0.5 ML IM: CPT | Performed by: STUDENT IN AN ORGANIZED HEALTH CARE EDUCATION/TRAINING PROGRAM

## 2022-10-03 PROCEDURE — 90471 IMMUNIZATION ADMIN: CPT | Performed by: STUDENT IN AN ORGANIZED HEALTH CARE EDUCATION/TRAINING PROGRAM

## 2022-10-03 PROCEDURE — 85027 COMPLETE CBC AUTOMATED: CPT | Performed by: STUDENT IN AN ORGANIZED HEALTH CARE EDUCATION/TRAINING PROGRAM

## 2022-10-03 PROCEDURE — 91312 COVID-19,PF,PFIZER BOOSTER BIVALENT: CPT | Performed by: STUDENT IN AN ORGANIZED HEALTH CARE EDUCATION/TRAINING PROGRAM

## 2022-10-03 PROCEDURE — 80061 LIPID PANEL: CPT | Performed by: STUDENT IN AN ORGANIZED HEALTH CARE EDUCATION/TRAINING PROGRAM

## 2022-10-03 PROCEDURE — 99396 PREV VISIT EST AGE 40-64: CPT | Mod: 25 | Performed by: STUDENT IN AN ORGANIZED HEALTH CARE EDUCATION/TRAINING PROGRAM

## 2022-10-03 RX ORDER — CLOBETASOL PROPIONATE 0.5 MG/G
OINTMENT TOPICAL 2 TIMES DAILY
Qty: 60 G | Refills: 0 | Status: SHIPPED | OUTPATIENT
Start: 2022-10-03

## 2022-10-03 ASSESSMENT — ENCOUNTER SYMPTOMS
FEVER: 0
SORE THROAT: 0
ABDOMINAL PAIN: 0
DYSURIA: 0
EYE PAIN: 0
DIZZINESS: 0
JOINT SWELLING: 0
HEADACHES: 0
HEMATURIA: 0
PALPITATIONS: 0
SHORTNESS OF BREATH: 1
NERVOUS/ANXIOUS: 0
FREQUENCY: 0
WEAKNESS: 0
ARTHRALGIAS: 0
NAUSEA: 0
CONSTIPATION: 0
CHILLS: 0
MYALGIAS: 0
HEMATOCHEZIA: 0
DIARRHEA: 0
COUGH: 0
HEARTBURN: 0
PARESTHESIAS: 0

## 2022-10-03 ASSESSMENT — PAIN SCALES - GENERAL: PAINLEVEL: NO PAIN (0)

## 2022-10-03 NOTE — PROGRESS NOTES
Assessment/ Plan     42-year-old male with past medical history of hypothyroidism who presents for annual physical exam.    1. Congenital hypothyroidism without goiter  Patient tells me he has a family history of hypothyroidism.  He was diagnosed by previous PCP based off lab testing.  Has been on 75 mcg daily since.  We will recheck his TSH today last check normal in 5/21  - TSH with free T4 reflex; Future    2. High priority for 2019-nCoV vaccine  - COVID-19,PF,PFIZER BOOSTER BIVALENT 12+Yrs    3. Atopic dermatitis, unspecified type  Patient has pruritus and dryness over his left cheek and forehead that is worse in the wintertime.  Really does not have a rash today but does have darkening of the skin over the area.  I question if these are changes related to chronic irritation from scratching.  Possible effect of chronic steroid use though he does not use the steroid cream more than a week or 2 at a time.  He is on third highest potency steroid but says the betamethasone does not work as well as previous medication was on.  Possibly clobetasol.  I did trial him on this.  We discussed extensively the possible side effects and he understands these.  Offered referral to dermatology for further work-up and management the patient wished to continue with clobetasol for now.  If patient has changing of the darkening I recommend he see dermatology for a biopsy given the location for best cosmetic effect.  - clobetasol (TEMOVATE) 0.05 % external ointment; Apply topically 2 times daily  Dispense: 60 g; Refill: 0    4. Annual physical exam  - CBC with platelets; Future  - Comprehensive metabolic panel (BMP + Alb, Alk Phos, ALT, AST, Total. Bili, TP); Future  - Lipid panel reflex to direct LDL Fasting; Future    5. Multiple atypical nevi  Patient has an atypical nevi over his right tricep that he states he was not born with and has developed over time.  Scattered melanocytic macules approximately 3 cm x 2 cm in area.  I  recommended a biopsy and we will schedule this.  Also has a nevus on his left inner thigh near his knee.  He states this has changed as well so we will biopsy this at the same time though I am less concerned about this particular lesion    Follow-up in:     Matthew Haile MD    Subjective:     Manoj Ling is a 42 year old male who presents for an annual exam.     Chief Complaint   Patient presents with     Physical     Pt is fasting. Flu shot. Covid booster. Med refill on levothyroxine.      He is feeling some SOB with mild activity over the last couple of months. No chest pain, wheezing, coughing. Some heart racing. Racing hear only with exertion. He used to use an elliptical regularly but not using it regularly.     Colonoscopy: no family Hx of colon    Answers for HPI/ROS submitted by the patient on 10/3/2022  Frequency of exercise:: None  Getting at least 3 servings of Calcium per day:: Yes  Diet:: Regular (no restrictions)  Taking medications regularly:: Yes  Medication side effects:: None  Bi-annual eye exam:: Yes  Dental care twice a year:: Yes  Sleep apnea or symptoms of sleep apnea:: None  abdominal pain: No  Blood in stool: No  Blood in urine: No  chest pain: No  chills: No  congestion: No  constipation: No  cough: No  diarrhea: No  dizziness: No  ear pain: No  eye pain: No  nervous/anxious: No  fever: No  frequency: No  genital sores: No  headaches: No  hearing loss: No  heartburn: No  arthralgias: No  joint swelling: No  peripheral edema: No  mood changes: No  myalgias: No  nausea: No  dysuria: No  palpitations: No  Skin sensation changes: No  sore throat: No  urgency: No  rash: No  shortness of breath: Yes  visual disturbance: No  weakness: No  impotence: No  penile discharge: No  Additional concerns today:: No    Immunization History   Administered Date(s) Administered     COVID-19,PF,Moderna 03/31/2021, 04/29/2021, 12/26/2021     HepA, Unspecified 12/13/2011     HepB, Unspecified 12/13/2011      Influenza Vaccine, 6+MO IM (QUADRIVALENT W/PRESERVATIVES) 09/23/2016, 09/13/2019     Meningococcal (Menactra ) 05/27/2013     Poliovirus, inactivated (IPV) 12/13/2011     TD (ADULT, 7+) 09/13/2019     Tdap (Adacel,Boostrix) 11/06/2009     Typhoid, Unspecified Formulation 12/13/2011     Immunization status: due today.     Current Outpatient Medications   Medication Sig Dispense Refill     betamethasone valerate (VALISONE) 0.1 % external cream Apply topically 2 times daily apply to the face and neck 45 g 1     cholecalciferol, vitamin D3, 5,000 unit Tab [CHOLECALCIFEROL, VITAMIN D3, 5,000 UNIT TAB] Take 2 capsules by mouth daily.       levothyroxine (SYNTHROID/LEVOTHROID) 75 MCG tablet Take 1 tablet (75 mcg) by mouth daily 90 tablet 0     Multiple Vitamin (MULTIVITAMIN PO)        History reviewed. No pertinent past medical history.  Past Surgical History:   Procedure Laterality Date     LASIK  2017     Patient has no known allergies.  Family History   Problem Relation Age of Onset     Hypertension Mother      Thyroid Disease Mother      Diabetes Father      Heart Disease Father      Cancer Maternal Grandfather      Social History     Socioeconomic History     Marital status:      Spouse name: Not on file     Number of children: Not on file     Years of education: Not on file     Highest education level: Not on file   Occupational History     Not on file   Tobacco Use     Smoking status: Never Smoker     Smokeless tobacco: Never Used   Substance and Sexual Activity     Alcohol use: No     Drug use: No     Sexual activity: Yes     Partners: Female   Other Topics Concern     Not on file   Social History Narrative     Not on file     Social Determinants of Health     Financial Resource Strain: Not on file   Food Insecurity: Not on file   Transportation Needs: Not on file   Physical Activity: Not on file   Stress: Not on file   Social Connections: Not on file   Intimate Partner Violence: Not on file   Housing  "Stability: Not on file       Review of Systems  Complete ROS negative except as noted in the HPI    Objective:      Vitals:    10/03/22 1123   BP: 120/80   BP Location: Left arm   Patient Position: Sitting   Cuff Size: Adult Regular   Pulse: 62   Resp: 16   Temp: 97.7  F (36.5  C)   TempSrc: Temporal   SpO2: 98%   Weight: 86.3 kg (190 lb 4.8 oz)   Height: 1.822 m (5' 11.75\")       General appearance: Alert, cooperative, no distress, appears stated age  Head: Normocephalic, atraumatic, without obvious abnormality  EARS: TM's gray dull with structures seen bilaterally  Eyes: Pupils equal round, reactive.  Conjunctiva clear.  Nose: Nares normal, no drainage.  Throat: Lips, mucosa, tongue normal mucosa pink and moist  Neck: Supple, symmetric, trachea midline, no adenopathy.  No thyroid enlargement, tenderness or nodules.    Lungs: Clear to auscultation bilaterally, no wheezing or crackles present.  Respirations unlabored  Heart: Regular rate and rhythm, normal S1 and S2, no murmur, rub or gallop.  Abdomen: Soft, nontender, nondistended.  Bowel sounds active in all 4 quadrants.  No masses or organomegaly.  Extremities: Extremities normal, atraumatic.  No cyanosis or edema.  Skin: Scattered melanocytic macules approximately 3 cm x 2 cm in area. Darkening of skin over left cheek and forehead   Neurologic: CN II through XII intact, normal strength.      Matthew Laird MD    "

## 2022-10-04 NOTE — RESULT ENCOUNTER NOTE
Manoj  Your results from your recent clinic visit show:  Your thyroid is functioning normal on your synthroid (TSH)  Your lipids look ok and I used these as well as other factors from your history to calculate your 10 year risk of having something like a heart attack and it was low risk. Just continue to work on exercise and diet to maintain this low risk.  Your CMP was normal with normal electrolytes, kidney function, and liver function  Your CBC is normal with no anemia or signs of infection seen    If you have more questions please call the clinic at [phone] or send me a [employer] message    Dr. Matthew Haile

## 2022-10-31 DIAGNOSIS — E03.8 SUBCLINICAL HYPOTHYROIDISM: ICD-10-CM

## 2022-11-01 NOTE — TELEPHONE ENCOUNTER
"Former patient of Anene & has not established care with another provider.  Please assign refill request to covering provider per clinic standard process.      Last Written Prescription Date:  8/9/22  Last Fill Quantity: 90,  # refills: 0   Last office visit provider:  10/3/22     Requested Prescriptions   Pending Prescriptions Disp Refills     levothyroxine (SYNTHROID/LEVOTHROID) 75 MCG tablet 90 tablet 0     Sig: Take 1 tablet (75 mcg) by mouth daily       Thyroid Protocol Passed - 10/31/2022  5:26 PM        Passed - Patient is 12 years or older        Passed - Recent (12 mo) or future (30 days) visit within the authorizing provider's specialty     Patient has had an office visit with the authorizing provider or a provider within the authorizing providers department within the previous 12 mos or has a future within next 30 days. See \"Patient Info\" tab in inbasket, or \"Choose Columns\" in Meds & Orders section of the refill encounter.              Passed - Medication is active on med list        Passed - Normal TSH on file in past 12 months     Recent Labs   Lab Test 10/03/22  1235   TSH 2.35                   Rachel Roland, RN 11/01/22 3:13 PM  "

## 2022-11-15 RX ORDER — LEVOTHYROXINE SODIUM 75 UG/1
75 TABLET ORAL DAILY
Qty: 90 TABLET | Refills: 0 | Status: SHIPPED | OUTPATIENT
Start: 2022-11-15 | End: 2023-02-07

## 2023-01-13 NOTE — TELEPHONE ENCOUNTER
Refill Approved    Rx renewed per Medication Renewal Policy. Medication was last renewed on 11/14/18.    Rachel Roland, [facility] Triage/Med Refill 6/4/2019     Requested Prescriptions   Pending Prescriptions Disp Refills     levothyroxine (SYNTHROID, LEVOTHROID) 75 MCG tablet 90 tablet 1     Sig: Take 1 tablet (75 mcg total) by mouth Daily at 6:00 am.       Thyroid Hormones Protocol Passed - 6/3/2019 10:30 AM        Passed - Provider visit in past 12 months or next 3 months     Last office visit with prescriber/PCP: 12/4/2017 Diego Dior MD OR same dept: 1/10/2019 Sunita Morrow MD OR same specialty: 1/10/2019 Sunita Morrow MD  Last physical: 8/24/2018 Last MTM visit: Visit date not found   Next visit within 3 mo: Visit date not found  Next physical within 3 mo: Visit date not found  Prescriber OR PCP: Diego Dior MD  Last diagnosis associated with med order: 1. Subclinical hypothyroidism  - levothyroxine (SYNTHROID, LEVOTHROID) 75 MCG tablet; Take 1 tablet (75 mcg total) by mouth Daily at 6:00 am.  Dispense: 90 tablet; Refill: 1    If protocol passes may refill for 12 months if within 3 months of last provider visit (or a total of 15 months).             Passed - TSH on file in past 12 months for patient age 12 & older     TSH   Date Value Ref Range Status   08/24/2018 3.22 0.30 - 5.00 uIU/mL Final                                29-Dec-2022 08:24

## 2023-02-06 DIAGNOSIS — E03.8 SUBCLINICAL HYPOTHYROIDISM: ICD-10-CM

## 2023-02-07 RX ORDER — LEVOTHYROXINE SODIUM 75 UG/1
75 TABLET ORAL DAILY
Qty: 90 TABLET | Refills: 2 | Status: SHIPPED | OUTPATIENT
Start: 2023-02-07

## 2023-02-07 NOTE — TELEPHONE ENCOUNTER
"Last Written Prescription Date:  11/15/22  Last Fill Quantity: 90,  # refills: 0   Last office visit provider:  10/3/22     Requested Prescriptions   Pending Prescriptions Disp Refills     levothyroxine (SYNTHROID/LEVOTHROID) 75 MCG tablet 90 tablet 0     Sig: Take 1 tablet (75 mcg) by mouth daily       Thyroid Protocol Failed - 2/7/2023  1:16 PM        Failed - Recent (12 mo) or future (30 days) visit within the authorizing provider's specialty     Patient has had an office visit with the authorizing provider or a provider within the authorizing providers department within the previous 12 mos or has a future within next 30 days. See \"Patient Info\" tab in inbasket, or \"Choose Columns\" in Meds & Orders section of the refill encounter.              Passed - Patient is 12 years or older        Passed - Medication is active on med list        Passed - Normal TSH on file in past 12 months     Recent Labs   Lab Test 10/03/22  1235   TSH 2.35                   Abdi Gibbs RN 02/07/23 1:16 PM  "

## 2023-11-17 ENCOUNTER — OFFICE VISIT (OUTPATIENT)
Dept: FAMILY MEDICINE | Facility: CLINIC | Age: 43
End: 2023-11-17
Payer: COMMERCIAL

## 2023-11-17 VITALS
BODY MASS INDEX: 26.14 KG/M2 | DIASTOLIC BLOOD PRESSURE: 70 MMHG | WEIGHT: 193 LBS | SYSTOLIC BLOOD PRESSURE: 110 MMHG | HEIGHT: 72 IN | OXYGEN SATURATION: 98 % | HEART RATE: 73 BPM | TEMPERATURE: 97.4 F

## 2023-11-17 DIAGNOSIS — E03.1 CONGENITAL HYPOTHYROIDISM WITHOUT GOITER: Primary | ICD-10-CM

## 2023-11-17 DIAGNOSIS — L20.9 ATOPIC DERMATITIS, UNSPECIFIED TYPE: ICD-10-CM

## 2023-11-17 DIAGNOSIS — Z00.00 ANNUAL PHYSICAL EXAM: ICD-10-CM

## 2023-11-17 LAB
ALBUMIN SERPL BCG-MCNC: 4.4 G/DL (ref 3.5–5.2)
ALP SERPL-CCNC: 47 U/L (ref 40–150)
ALT SERPL W P-5'-P-CCNC: 31 U/L (ref 0–70)
ANION GAP SERPL CALCULATED.3IONS-SCNC: 8 MMOL/L (ref 7–15)
AST SERPL W P-5'-P-CCNC: 31 U/L (ref 0–45)
BILIRUB SERPL-MCNC: 0.4 MG/DL
BUN SERPL-MCNC: 12.8 MG/DL (ref 6–20)
CALCIUM SERPL-MCNC: 9.1 MG/DL (ref 8.6–10)
CHLORIDE SERPL-SCNC: 106 MMOL/L (ref 98–107)
CHOLEST SERPL-MCNC: 192 MG/DL
CREAT SERPL-MCNC: 0.97 MG/DL (ref 0.67–1.17)
DEPRECATED HCO3 PLAS-SCNC: 27 MMOL/L (ref 22–29)
EGFRCR SERPLBLD CKD-EPI 2021: >90 ML/MIN/1.73M2
ERYTHROCYTE [DISTWIDTH] IN BLOOD BY AUTOMATED COUNT: 12.9 % (ref 10–15)
GLUCOSE SERPL-MCNC: 92 MG/DL (ref 70–99)
HCT VFR BLD AUTO: 41.7 % (ref 40–53)
HDLC SERPL-MCNC: 53 MG/DL
HGB BLD-MCNC: 14.2 G/DL (ref 13.3–17.7)
LDLC SERPL CALC-MCNC: 119 MG/DL
MCH RBC QN AUTO: 29.3 PG (ref 26.5–33)
MCHC RBC AUTO-ENTMCNC: 34.1 G/DL (ref 31.5–36.5)
MCV RBC AUTO: 86 FL (ref 78–100)
NONHDLC SERPL-MCNC: 139 MG/DL
PLATELET # BLD AUTO: 264 10E3/UL (ref 150–450)
POTASSIUM SERPL-SCNC: 4.2 MMOL/L (ref 3.4–5.3)
PROT SERPL-MCNC: 7.4 G/DL (ref 6.4–8.3)
RBC # BLD AUTO: 4.85 10E6/UL (ref 4.4–5.9)
SODIUM SERPL-SCNC: 141 MMOL/L (ref 135–145)
T4 FREE SERPL-MCNC: 1.11 NG/DL (ref 0.9–1.7)
TRIGL SERPL-MCNC: 100 MG/DL
TSH SERPL DL<=0.005 MIU/L-ACNC: 8.35 UIU/ML (ref 0.3–4.2)
WBC # BLD AUTO: 5.9 10E3/UL (ref 4–11)

## 2023-11-17 PROCEDURE — 90471 IMMUNIZATION ADMIN: CPT | Performed by: STUDENT IN AN ORGANIZED HEALTH CARE EDUCATION/TRAINING PROGRAM

## 2023-11-17 PROCEDURE — 84439 ASSAY OF FREE THYROXINE: CPT | Performed by: STUDENT IN AN ORGANIZED HEALTH CARE EDUCATION/TRAINING PROGRAM

## 2023-11-17 PROCEDURE — 99213 OFFICE O/P EST LOW 20 MIN: CPT | Mod: 25 | Performed by: STUDENT IN AN ORGANIZED HEALTH CARE EDUCATION/TRAINING PROGRAM

## 2023-11-17 PROCEDURE — 91320 SARSCV2 VAC 30MCG TRS-SUC IM: CPT | Performed by: STUDENT IN AN ORGANIZED HEALTH CARE EDUCATION/TRAINING PROGRAM

## 2023-11-17 PROCEDURE — 90686 IIV4 VACC NO PRSV 0.5 ML IM: CPT | Performed by: STUDENT IN AN ORGANIZED HEALTH CARE EDUCATION/TRAINING PROGRAM

## 2023-11-17 PROCEDURE — 99396 PREV VISIT EST AGE 40-64: CPT | Mod: 25 | Performed by: STUDENT IN AN ORGANIZED HEALTH CARE EDUCATION/TRAINING PROGRAM

## 2023-11-17 PROCEDURE — 85027 COMPLETE CBC AUTOMATED: CPT | Performed by: STUDENT IN AN ORGANIZED HEALTH CARE EDUCATION/TRAINING PROGRAM

## 2023-11-17 PROCEDURE — 84443 ASSAY THYROID STIM HORMONE: CPT | Performed by: STUDENT IN AN ORGANIZED HEALTH CARE EDUCATION/TRAINING PROGRAM

## 2023-11-17 PROCEDURE — 90480 ADMN SARSCOV2 VAC 1/ONLY CMP: CPT | Performed by: STUDENT IN AN ORGANIZED HEALTH CARE EDUCATION/TRAINING PROGRAM

## 2023-11-17 PROCEDURE — 80053 COMPREHEN METABOLIC PANEL: CPT | Performed by: STUDENT IN AN ORGANIZED HEALTH CARE EDUCATION/TRAINING PROGRAM

## 2023-11-17 PROCEDURE — 36415 COLL VENOUS BLD VENIPUNCTURE: CPT | Performed by: STUDENT IN AN ORGANIZED HEALTH CARE EDUCATION/TRAINING PROGRAM

## 2023-11-17 PROCEDURE — 80061 LIPID PANEL: CPT | Performed by: STUDENT IN AN ORGANIZED HEALTH CARE EDUCATION/TRAINING PROGRAM

## 2023-11-17 ASSESSMENT — ENCOUNTER SYMPTOMS
ARTHRALGIAS: 0
PALPITATIONS: 0
NAUSEA: 0
HEMATOCHEZIA: 0
ABDOMINAL PAIN: 0
SORE THROAT: 1
HEADACHES: 0
CONSTIPATION: 0
DIARRHEA: 0
DYSURIA: 0
JOINT SWELLING: 0
COUGH: 0
SHORTNESS OF BREATH: 0
FREQUENCY: 0
HEMATURIA: 0
HEARTBURN: 0
PARESTHESIAS: 0
EYE PAIN: 0
MYALGIAS: 0
CHILLS: 0
DIZZINESS: 0
WEAKNESS: 0
FEVER: 0
NERVOUS/ANXIOUS: 0

## 2023-11-17 ASSESSMENT — PAIN SCALES - GENERAL: PAINLEVEL: NO PAIN (0)

## 2023-11-17 NOTE — PROGRESS NOTES
Assessment/ Plan   43 year old male with PMH of hypothyroidism and atopic dermatitis who presents for annual physical exam    1. Congenital hypothyroidism without goiter  Patient tells me he has a family history of hypothyroidism.  He was diagnosed by previous PCP based off lab testing.  Has been on 75 mcg daily since.  We will recheck his TSH today last check normal in 5/21  - TSH with free T4 reflex; Future    TSH   Date Value Ref Range Status   10/03/2022 2.35 0.30 - 4.20 uIU/mL Final   05/19/2021 4.82 0.30 - 5.00 uIU/mL Final     TSH returned normal.    11/23:  Will recheck as has not been on this for 2 months    - TSH; Future  - T4, free; Future    2. Atopic dermatitis, unspecified type    3. Annual physical exam  - Comprehensive metabolic panel (BMP + Alb, Alk Phos, ALT, AST, Total. Bili, TP); Future  - CBC with platelets; Future  - Lipid panel reflex to direct LDL Fasting; Future    Follow-up in:  1 year for a physical    Matthew Haile MD    Subjective:     Manoj Ling is a 43 year old male who presents for an annual exam.     Chief Complaint   Patient presents with    Physical    Medication Refill     Atypical nevi:  10/22:  Patient has an atypical nevi over his right tricep that he states he was not born with and has developed over time.  Scattered melanocytic macules approximately 3 cm x 2 cm in area.  I recommended a biopsy and we will schedule this.  Also has a nevus on his left inner thigh near his knee.  He states this has changed as well so we will biopsy this at the same time though I am less concerned about this particular lesion  He no showed appointment for this.    11/23:  Patient has not noticed any changes in the lesions since last year so we decided not to biopsy now. He will contact me if changing again.    Atopic dermatitis, unspecified type  Patient has pruritus and dryness over his left cheek and forehead that is worse in the wintertime.  Really does not have a rash today but does  "have darkening of the skin over the area.  I question if these are changes related to chronic irritation from scratching.  Possible effect of chronic steroid use though he does not use the steroid cream more than a week or 2 at a time.  He is on third highest potency steroid but says the betamethasone does not work as well as previous medication was on.  Possibly clobetasol.  I did trial him on this.  We discussed extensively the possible side effects and he understands these.  Offered referral to dermatology for further work-up and management the patient wished to continue with clobetasol for now.  If patient has changing of the darkening I recommend he see dermatology for a biopsy given the location for best cosmetic effect.  - clobetasol (TEMOVATE) 0.05 % external ointment; Apply topically 2 times daily  Dispense: 60 g; Refill: 0    11/23:  He used the clobetasol and this took away the rash. He stopped using and now flairing again.     Patient tells me he has felt felt some pain in his bicep area and shoulder on the left intermittently.     Colonoscopy:  PSA:  No results found for: \"PSA\"    Immunization History   Administered Date(s) Administered    COVID-19 Bivalent 12+ (Pfizer) 10/03/2022    COVID-19 Monovalent 18+ (Moderna) 03/31/2021, 04/29/2021, 12/26/2021    HepA, Unspecified 12/13/2011    HepB, Unspecified 12/13/2011    Influenza Vaccine >6 months (Alfuria,Fluzone) 10/03/2022    Influenza Vaccine, 6+MO IM (QUADRIVALENT W/PRESERVATIVES) 09/23/2016, 09/13/2019    Meningococcal ACWY (Menactra ) 05/27/2013    Poliovirus, inactivated (IPV) 12/13/2011    TD,PF 7+ (Tenivac) 09/13/2019    TDAP (Adacel,Boostrix) 11/06/2009    Typhoid, Unspecified Formulation 12/13/2011     Immunization status: due today.     Current Outpatient Medications   Medication Sig Dispense Refill    cholecalciferol, vitamin D3, 5,000 unit Tab [CHOLECALCIFEROL, VITAMIN D3, 5,000 UNIT TAB] Take 2 capsules by mouth daily.      clobetasol " (TEMOVATE) 0.05 % external ointment Apply topically 2 times daily 60 g 0    Multiple Vitamin (MULTIVITAMIN PO)       levothyroxine (SYNTHROID/LEVOTHROID) 75 MCG tablet Take 1 tablet (75 mcg) by mouth daily (Patient not taking: Reported on 11/17/2023) 90 tablet 2     No past medical history on file.  Past Surgical History:   Procedure Laterality Date    LASIK  2017     Patient has no known allergies.  Family History   Problem Relation Age of Onset    Hypertension Mother     Thyroid Disease Mother     Diabetes Father     Heart Disease Father     Cancer Maternal Grandfather      Social History     Socioeconomic History    Marital status:      Spouse name: Not on file    Number of children: Not on file    Years of education: Not on file    Highest education level: Not on file   Occupational History    Not on file   Tobacco Use    Smoking status: Never    Smokeless tobacco: Never   Substance and Sexual Activity    Alcohol use: No    Drug use: No    Sexual activity: Yes     Partners: Female     Comment: wife   Other Topics Concern    Not on file   Social History Narrative    Not on file     Social Determinants of Health     Financial Resource Strain: Low Risk  (11/17/2023)    Financial Resource Strain     Within the past 12 months, have you or your family members you live with been unable to get utilities (heat, electricity) when it was really needed?: No   Food Insecurity: Low Risk  (11/17/2023)    Food Insecurity     Within the past 12 months, did you worry that your food would run out before you got money to buy more?: No     Within the past 12 months, did the food you bought just not last and you didn t have money to get more?: No   Transportation Needs: Low Risk  (11/17/2023)    Transportation Needs     Within the past 12 months, has lack of transportation kept you from medical appointments, getting your medicines, non-medical meetings or appointments, work, or from getting things that you need?: No   Physical  Activity: Not on file   Stress: Not on file   Social Connections: Not on file   Interpersonal Safety: Low Risk  (11/17/2023)    Interpersonal Safety     Do you feel physically and emotionally safe where you currently live?: Yes     Within the past 12 months, have you been hit, slapped, kicked or otherwise physically hurt by someone?: No     Within the past 12 months, have you been humiliated or emotionally abused in other ways by your partner or ex-partner?: No   Housing Stability: Low Risk  (11/17/2023)    Housing Stability     Do you have housing? : Yes     Are you worried about losing your housing?: No       Review of Systems  Complete ROS negative except as noted in the HPI    Objective:      Vitals:    11/17/23 0811   BP: 110/70   Pulse: 73   Temp: 97.4  F (36.3  C)   SpO2: 98%   Weight: 87.5 kg (193 lb)   Height: 1.829 m (6')       General appearance: Alert, cooperative, no distress, appears stated age  Head: Normocephalic, atraumatic, without obvious abnormality  EARS: TM's gray dull with structures seen bilaterally  Eyes: Pupils equal round, reactive.  Conjunctiva clear.  Nose: Nares normal, no drainage.  Throat: Lips, mucosa, tongue normal mucosa pink and moist  Neck: Supple, symmetric, trachea midline, no adenopathy.  No thyroid enlargement, tenderness or nodules.    Lungs: Clear to auscultation bilaterally, no wheezing or crackles present.  Respirations unlabored  Heart: Regular rate and rhythm, normal S1 and S2, no murmur, rub or gallop.  Abdomen: Soft, nontender, nondistended.  Bowel sounds active in all 4 quadrants.  No masses or organomegaly.  Extremities: Extremities normal, atraumatic.  No cyanosis or edema.  Skin: xerotic whitened rash over forehead and cheek.   Neurologic: CN II through XII intact, normal strength.      Matthew Laird MD

## 2023-11-20 NOTE — RESULT ENCOUNTER NOTE
Manoj,  Your results from your recent clinic visit show:  Your thyroid showed an abnormal TSH but normal T4. This is typically something called subclinical hypothyroidism. Meaning its not considered bad enough to treat but does make your numbers abnormal. I would recommend at least checking this again in a year. You could take the medicine though if you prefer and recheck in about 2 months. Let me know which you would like to do?    Your lipids look ok and I used these as well as other factors from your history to calculate your 10 year risk of having something like a heart attack (ASCVD risk) and it was low risk. Just continue to work on exercise and diet to maintain this low risk.    The 10-year ASCVD risk score (Manny PIERRE, et al., 2019) is: 0.8%    Values used to calculate the score:      Age: 43 years      Sex: Male      Is Non- : No      Diabetic: No      Tobacco smoker: No      Systolic Blood Pressure: 110 mmHg      Is BP treated: No      HDL Cholesterol: 53 mg/dL      Total Cholesterol: 192 mg/dL    Your CMP was normal with normal electrolytes, kidney function, and liver function  Your CBC is normal with no anemia or signs of infection seen    If you have more questions please call the clinic at [phone] or send me a [employer] message    Dr. Matthew Haile

## 2024-03-25 ENCOUNTER — E-VISIT (OUTPATIENT)
Dept: FAMILY MEDICINE | Facility: CLINIC | Age: 44
End: 2024-03-25
Payer: COMMERCIAL

## 2024-03-25 DIAGNOSIS — H10.33 ACUTE BACTERIAL CONJUNCTIVITIS OF BOTH EYES: Primary | ICD-10-CM

## 2024-03-25 PROCEDURE — 99421 OL DIG E/M SVC 5-10 MIN: CPT | Performed by: PHYSICIAN ASSISTANT

## 2024-03-25 RX ORDER — POLYMYXIN B SULFATE AND TRIMETHOPRIM 1; 10000 MG/ML; [USP'U]/ML
SOLUTION OPHTHALMIC
Qty: 10 ML | Refills: 0 | Status: SHIPPED | OUTPATIENT
Start: 2024-03-25 | End: 2024-04-01

## 2024-03-25 NOTE — PATIENT INSTRUCTIONS
Thank you for choosing us for your care. I have placed an order for a prescription so that you can start treatment. View your full visit summary for details by clicking on the link below. Your pharmacist will able to address any questions you may have about the medication.     If you re not feeling better within 2-3 days, please schedule an appointment.  You can schedule an appointment right here in [facility], or call [phone]  If the visit is for the same symptoms as your eVisit, we ll refund the cost of your eVisit if seen within seven days.    Pinkeye: Care Instructions  Overview     Pinkeye is redness and swelling of the eye surface and the conjunctiva (the lining of the eyelid and the covering of the white part of the eye). Pinkeye is also called conjunctivitis. Pinkeye is often caused by infection with bacteria or a virus. Dry air, allergies, smoke, and chemicals are other common causes.  Pinkeye often gets better on its own in 7 to 10 days. Antibiotics only help if the pinkeye is caused by bacteria. Pinkeye caused by infection spreads easily. If an allergy or chemical is causing pinkeye, it will not go away unless you can avoid whatever is causing it.  Follow-up care is a key part of your treatment and safety. Be sure to make and go to all appointments, and call your doctor if you are having problems. It's also a good idea to know your test results and keep a list of the medicines you take.  How can you care for yourself at home?  Wash your hands often. Always wash them before and after you treat pinkeye or touch your eyes or face.  Use moist cotton or a clean, wet cloth to remove crust. Wipe from the inside corner of the eye to the outside. Use a clean part of the cloth for each wipe.  Put cold or warm wet cloths on your eye a few times a day if the eye hurts.  Do not wear contact lenses or eye makeup until the pinkeye is gone. Throw away any eye makeup you were using when you got pinkeye. Clean your  "contacts and storage case. If you wear disposable contacts, use a new pair when your eye has cleared and it is safe to wear contacts again.  If the doctor gave you antibiotic ointment or eyedrops, use them as directed. Use the medicine for as long as instructed, even if your eye starts looking better soon. Keep the bottle tip clean, and do not let it touch the eye area.  To put in eyedrops or ointment:  Tilt your head back, and pull your lower eyelid down with one finger.  Drop or squirt the medicine inside the lower lid.  Close your eye for 30 to 60 seconds to let the drops or ointment move around.  Do not touch the ointment or dropper tip to your eyelashes or any other surface.  Do not share towels, pillows, or washcloths while you have pinkeye.  When should you call for help?   Call your doctor now or seek immediate medical care if:    You have pain in your eye, not just irritation on the surface.     You have a change in vision or loss of vision.     You have an increase in discharge from the eye.     Your eye has not started to improve or begins to get worse within 48 hours after you start using antibiotics.     Pinkeye lasts longer than 7 days.   Watch closely for changes in your health, and be sure to contact your doctor if you have any problems.  Where can you learn more?  Go to https://www.[employer].net/patiented  Enter Y392 in the search box to learn more about \"Pinkeye: Care Instructions.\"  Current as of: June 5, 2023               Content Version: 14.0    9884-3430 [employer].   Care instructions adapted under license by your healthcare professional. If you have questions about a medical condition or this instruction, always ask your healthcare professional. [employer] disclaims any warranty or liability for your use of this information.      "

## 2025-01-18 ENCOUNTER — HEALTH MAINTENANCE LETTER (OUTPATIENT)
Age: 45
End: 2025-01-18